# Patient Record
Sex: FEMALE | Race: WHITE | NOT HISPANIC OR LATINO | Employment: FULL TIME | URBAN - METROPOLITAN AREA
[De-identification: names, ages, dates, MRNs, and addresses within clinical notes are randomized per-mention and may not be internally consistent; named-entity substitution may affect disease eponyms.]

---

## 2020-12-08 ENCOUNTER — OFFICE VISIT - HEALTHEAST (OUTPATIENT)
Dept: FAMILY MEDICINE | Facility: CLINIC | Age: 55
End: 2020-12-08

## 2020-12-08 DIAGNOSIS — Z12.31 VISIT FOR SCREENING MAMMOGRAM: ICD-10-CM

## 2020-12-08 DIAGNOSIS — Z12.11 SCREEN FOR COLON CANCER: ICD-10-CM

## 2020-12-08 DIAGNOSIS — H93.13 TINNITUS, BILATERAL: ICD-10-CM

## 2020-12-08 RX ORDER — VALACYCLOVIR HYDROCHLORIDE 1 G/1
TABLET, FILM COATED ORAL
Status: SHIPPED | COMMUNITY
Start: 2020-10-29 | End: 2023-11-09

## 2020-12-15 ENCOUNTER — OFFICE VISIT - HEALTHEAST (OUTPATIENT)
Dept: OTOLARYNGOLOGY | Facility: CLINIC | Age: 55
End: 2020-12-15

## 2020-12-15 ENCOUNTER — OFFICE VISIT - HEALTHEAST (OUTPATIENT)
Dept: AUDIOLOGY | Facility: CLINIC | Age: 55
End: 2020-12-15

## 2020-12-15 DIAGNOSIS — H93.8X3 EAR FULLNESS, BILATERAL: ICD-10-CM

## 2020-12-15 DIAGNOSIS — H90.42 SENSORINEURAL HEARING LOSS (SNHL) OF LEFT EAR WITH UNRESTRICTED HEARING OF RIGHT EAR: ICD-10-CM

## 2020-12-15 DIAGNOSIS — H93.12 LEFT-SIDED TINNITUS: ICD-10-CM

## 2020-12-15 DIAGNOSIS — M26.623 BILATERAL TEMPOROMANDIBULAR JOINT PAIN: ICD-10-CM

## 2020-12-15 DIAGNOSIS — H93.12 TINNITUS, LEFT: ICD-10-CM

## 2021-01-12 ENCOUNTER — COMMUNICATION - HEALTHEAST (OUTPATIENT)
Dept: TELEHEALTH | Facility: CLINIC | Age: 56
End: 2021-01-12

## 2021-01-12 ENCOUNTER — OFFICE VISIT - HEALTHEAST (OUTPATIENT)
Dept: AUDIOLOGY | Facility: CLINIC | Age: 56
End: 2021-01-12

## 2021-01-12 DIAGNOSIS — H93.12 LEFT-SIDED TINNITUS: ICD-10-CM

## 2021-03-09 ENCOUNTER — COMMUNICATION - HEALTHEAST (OUTPATIENT)
Dept: FAMILY MEDICINE | Facility: CLINIC | Age: 56
End: 2021-03-09

## 2021-03-09 ENCOUNTER — HOSPITAL ENCOUNTER (OUTPATIENT)
Dept: MAMMOGRAPHY | Facility: CLINIC | Age: 56
Discharge: HOME | End: 2021-03-09
Attending: FAMILY MEDICINE

## 2021-03-09 DIAGNOSIS — Z12.31 VISIT FOR SCREENING MAMMOGRAM: ICD-10-CM

## 2021-03-29 ENCOUNTER — AMBULATORY - HEALTHEAST (OUTPATIENT)
Dept: NURSING | Facility: CLINIC | Age: 56
End: 2021-03-29

## 2021-04-19 ENCOUNTER — AMBULATORY - HEALTHEAST (OUTPATIENT)
Dept: NURSING | Facility: CLINIC | Age: 56
End: 2021-04-19

## 2021-05-02 ENCOUNTER — HEALTH MAINTENANCE LETTER (OUTPATIENT)
Age: 56
End: 2021-05-02

## 2021-06-05 VITALS
HEART RATE: 84 BPM | RESPIRATION RATE: 16 BRPM | DIASTOLIC BLOOD PRESSURE: 86 MMHG | WEIGHT: 273.5 LBS | SYSTOLIC BLOOD PRESSURE: 141 MMHG

## 2021-06-13 NOTE — PROGRESS NOTES
Assessment/plan   1. Tinnitus, bilateral  Bilateral tinnitus in absence of vertiginous or red flag symptoms.  No focal neurologic findings to warrant MRI.  Otologic exam otherwise normal.  We will proceed with audiometry.  - Ambulatory referral to Audiology    2. Visit for screening mammogram  - Mammo Screening Bilateral; Future    3. Screen for colon cancer  - Vishal Pena DO    Options for treatment and follow-up care were reviewed with the patient. Alena Beatty engaged in the decision making process and verbalized understanding of the options discussed and agreed with the final plan.    This note has been dictated using voice recognition software. Any grammatical or context distortions are unintentional and inherent to the software.    Subjective:      HPI: Alena Beatty is a 55 y.o. female who is here for:    Chief Complaint   Patient presents with     Tinnitus     ringing in ears     2-week history of high-pitched ringing in ears with occasional feelings of muffled hearing.  It is present all the time, but does not necessarily bother her until she pays attention to it.  She had some bilateral ear pain yesterday but that is now resolved.  She has tried numerous things including Benadryl, Liz pot, Sudafed, peroxide, Flonase, Afrin, Mucinex, and humidifiers.  She had a hearing test many years ago that was reportedly normal.  She denies congestion, cough, sinus pain, headache, vision changes, dizziness, lightheadedness.    10 point review systems otherwise negative.    Medical History:  Patient Active Problem List   Diagnosis     ADHD (attention deficit hyperactivity disorder), inattentive type     Intestinal bacterial overgrowth     Screening for malignant neoplasm of cervix     Shellfish allergy     Past Medical History:   Diagnosis Date     ADHD      HSV-2 infection        Medications:  Current Outpatient Medications   Medication Sig Dispense Refill     valACYclovir (VALTREX) 1000 MG  tablet        No current facility-administered medications for this visit.        Imaging & Labs reviewed this visit: none    Objective:    /86 (Patient Site: Left Arm, Patient Position: Sitting, Cuff Size: Adult Large)   Pulse 84   Resp 16   Wt (!) 273 lb 8 oz (124.1 kg)   LMP 12/08/2016     Physical Exam:   General appearance: Alert, cooperative, no distress, appears stated age  Head: Normocephalic, atraumatic, without obvious abnormality  Ears: TM's gray dull with structures seen bilaterally  Eyes: Pupils equal round, reactive.  Conjunctiva clear.  Nose: Nares normal, no drainage.  Throat: Lips, mucosa, tongue normal mucosa pink and moist  Neck: Supple, symmetric, trachea midline, no adenopathy.  No thyroid enlargement, tenderness or nodules.    Lungs: Clear to auscultation bilaterally, no wheezing or crackles present.  Respirations unlabored  Heart: Regular rate and rhythm, normal S1 and S2, no murmur, rub or gallop.  Neuro:  Awake, alert, responsive to voice, follows commands. Clear coherent speech. No facial droop.. No dysarthria or word finding difficulties. CNII-XII intact. No gait ataxia.     No results found for this or any previous visit (from the past 24 hour(s)).

## 2021-06-13 NOTE — PROGRESS NOTES
CHIEF COMPLAINT:   Chief Complaint   Patient presents with     Tinnitus     C/o ear rinning for last three weeks, mostly on left side.          HISTORY OF PRESENT ILLNESS    Alena was seen at the behest of Jaclyn Pena for left sided tinnitus.  This has been present for 3 weeks.  She describes it as a high pitched buzz.  She was walking her dog three weeks ago and both ear felt plugged but the sound of traffic nearby was exremely loud.  She gets sharp pain in both ears intermittently that lasts seconds.  No obvious trigger.  No dizziness or vertigo.  No history of noise exposure.  Tinnitus is rated as 2/10.   History of seasonal allergies (spring/fall).   Some dry cough and runny nose during these seasons.  She was placed on wellbutrin for concentration issues (ADD) and recently stopped 2 weeks ago due to clenching.  No other ear nose or throat related complaints today.    Chief Complaint   Patient presents with     Tinnitus     C/o ear rinning for last three weeks, mostly on left side.             REVIEW OF SYSTEMS    Review of Systems: a 10-system review is reviewed at this encounter.  See scanned document.     Shellfish containing products and Amoxicillin       There were no vitals filed for this visit.      PHYSICAL EXAM:        HEAD: Normal appearance and symmetry:  No cutaneous lesions.      EARS:    Normal TM's bilaterally. Normal auditory canals and external ears. Non-tender.         NOSE:    Dorsum:   straight  Septum: normal  Mucosa:  moist  Inferior turbinates:  normal       ORAL CAVITY/OROPHARYNX:    Lips:  Normal.  Tongue: normal, midline  Mucosa:   no lesions  Tonsils:  1+      NECK:  Trachea:  midline.   Thyroid:  normal   Adenopathy:  none       NEURO:   Alert and Oriented    GAIT AND STATION:  normal     RESPIRATORY:   Symmetry and Respiratory effort    PSYCH:   normal mood and affect    SKIN:  warm and dry     Audiogram was reviewed today which shows some mild high-frequency hearing loss in the  left ear.  Word recognition is 100% bilaterally tympanograms are normal.    IMPRESSION:    Encounter Diagnoses   Name Primary?     Tinnitus, left Yes     Ear fullness, bilateral      Sensorineural hearing loss (SNHL) of left ear with unrestricted hearing of right ear      Bilateral temporomandibular joint pain           RECOMMENDATIONS:    Patient has new onset left-sided tinnitus with some asymmetric hearing loss on the left ear.  Recommend screening ABR.  Discussed the possibility of a CP angle lesion although it is unlikely.  Patient is agreeable to this plan of care and will comply.  Recommend repeat hearing test in 6 months.  If hearing is stable to recommend annual hearing test.  All questions were answered she is agreeable this plan of care      ABR referral placed  Return visit for repeat hearing in 6 months  Return immediately for any sudden change in hearing

## 2021-06-14 NOTE — PROGRESS NOTES
Patient notified of normal ABR results per Dr. Chacon. She will be following up in 6 months with a hearing test.    Katie Jensen RN  [facility]  [phone]

## 2021-06-14 NOTE — PROGRESS NOTES
"Audiology Report:    Referring Provider: Nick Chacon MD     SUBJECTIVE: Alena Ferro, 55 y.o. female, was seen on 1/12/2021 for a neurodiagnostic Auditory Brainstem Response (ABR) evaluation. [place] hearing was last assessed on 12/15/2020 and results revealed normal hearing bilaterally with 5-10 dBHL asymmetry from 1-8kHz, left ear worse. She continues to report concerns of left-sided constant high-pitched tinnitus, decreased hearing in the left ear, a sensation in the left ear that she likens to, \"a pin scratching the surface of your skin; not painful, and not a plugged sensation.\" She also reports recent dizziness lasting for a few seconds when she rolls over in bed. She recalls experiencing vertigo for 3 weeks during December 2019 in which she became quite nauseous. She tried doing the epley maneuver during the 3 weeks of nausea, but that made the nausea worse.    OBJECTIVE: Otoscopy revealed clear canals bilaterally. Tympanograms showed normal middle ear function bilaterally.     Neurodiagnostic protocol completed on the [employer] Eclipse EP-25 system. Absolute wave, interwave, and interaural latency differences assessed. Interaural interpeak (I-III or I-V) latency differences exceeding 0.40 ms or absent wave III and/or wave V, should be interpreted as evidence of possible retrocochlear pathology. Click stimuli at 90 dBnHL completed at various rates (21.1, 11.1, and 71.1/sec).    Interaural latencies (I-III and I-V) do not differ by more than 0.40 ms; therefore, are within normal limits.     ASSESSMENT: Interaural latencies do not differ by more than 0.40 ms; therefore, are within normal limits. Results do not suggest the presence of possible retrocochlear pathology.     PLAN: Normal findings were discussed with the patient today. She will receive a call from Dr. Chacon to further discuss today's results and the next steps of care. Alena Ferro will follow up with audiology in 6 months for " a repeat hearing test, or sooner if changes in hearing are noticed.     Irina Koch, CCC-A  Clinical Audiologist   [place] #[id number]     CC: Nick Chacon MD

## 2021-06-15 NOTE — TELEPHONE ENCOUNTER
Reason contacted:  Results  Information relayed: Informed patient of message below. Patient states understanding.  Additional questions:  No  Further follow-up needed:  No  Okay to leave a detailed message:  No

## 2021-06-15 NOTE — TELEPHONE ENCOUNTER
Left message to call back for: Results  Information to relay to patient:  LMTCB, Please relay message below from provider.

## 2021-06-15 NOTE — TELEPHONE ENCOUNTER
----- Message from Jaclyn Pena DO sent at 3/9/2021 12:04 PM CST -----  Team - please call patient with results.      Normal mammogram, plan to repeat in 1 year.    Jaclyn Pena DO

## 2021-06-18 NOTE — PATIENT INSTRUCTIONS - HE
Patient Instructions by Nick Chacon MD at 12/15/2020  3:20 PM     Author: Nick Chacon MD Service: -- Author Type: Physician    Filed: 12/15/2020  3:44 PM Encounter Date: 12/15/2020 Status: Addendum    : Nick Chacon MD (Physician)    Related Notes: Original Note by Nick Chacon MD (Physician) filed at 12/15/2020  3:31 PM       Patient Education     Tinnitus (Ringing in the Ears)     Treatment may include maskers and hearing aids.     Tinnitus is the term for a noise in your ear not caused by an outside sound. The noise might be a ringing, clicking, hiss, or roar. It can vary in pitch and may be soft or quite loud. For some people, tinnitus is a minor nuisance. But for others, the noise can make it hard to hear, work, and even sleep. When tinnitus can't be cured, a number of treatments may offer relief.  What causes tinnitus?  Loud noises, hearing loss, and ear wax can cause tinnitus. So can certain medicines. Large amounts of aspirin or caffeine are sometimes to blame. In many cases, the exact cause of tinnitus is unknown.  How is tinnitus treated?  Identifying and removing the cause is the best way to treat tinnitus. For that reason, your healthcare provider may refer you to an otolaryngologist (ear, nose, and throat doctor). Your hearing may also be checked by an audiologist (hearing specialist). If you have hearing loss, wearing a hearing aid may help your tinnitus. When the cause can't be found, the tinnitus itself may be treated. Some of the treatments are listed below, and your healthcare provider can tell you more about them:    Maskers are small devices that look like hearing aids. They emit a pleasant sound that helps cover up the ringing in your ears. Hearing aids and maskers are sometimes used together.    Medicines that treat anxiety and depression may ease tinnitus in some people.    Hypnosis or relaxation therapy may help head noise seem less severe.    Tinnitus retraining therapy  combines counseling and maskers. Both can help take your mind off the tinnitus.  For more information    American Speech-Hearing-Language Association [phone] www.akiko.org    American Tinnitus Association [phone] www.alexandria.org    National [place] on Deafness and other Communication Disorders 162-969-2336 www.nidcd.nih.gov   Date Last Reviewed: 7/1/2016 2000-2019 [employer]. [address] [place] [address], [place] [address]. All rights reserved. This information is not intended as a substitute for professional medical care. Always follow your healthcare professional's instructions.         ABR referral placed  Return visit for repeat hearing in 6 months  Return immediately for any sudden change in hearing

## 2021-06-18 NOTE — PATIENT INSTRUCTIONS - HE
Patient Instructions by Jaclyn Pena DO at 12/8/2020  1:40 PM     Author: Jaclyn Pena DO Service: -- Author Type: Physician    Filed: 12/8/2020  2:09 PM Encounter Date: 12/8/2020 Status: Signed    : Jaclyn Pena DO (Physician)       Patient Education     Tinnitus (Ringing in the Ears)     Treatment may include maskers and hearing aids.     Tinnitus is the term for a noise in your ear not caused by an outside sound. The noise might be a ringing, clicking, hiss, or roar. It can vary in pitch and may be soft or quite loud. For some people, tinnitus is a minor nuisance. But for others, the noise can make it hard to hear, work, and even sleep. When tinnitus can't be cured, a number of treatments may offer relief.  What causes tinnitus?  Loud noises, hearing loss, and ear wax can cause tinnitus. So can certain medicines. Large amounts of aspirin or caffeine are sometimes to blame. In many cases, the exact cause of tinnitus is unknown.  How is tinnitus treated?  Identifying and removing the cause is the best way to treat tinnitus. For that reason, your healthcare provider may refer you to an otolaryngologist (ear, nose, and throat doctor). Your hearing may also be checked by an audiologist (hearing specialist). If you have hearing loss, wearing a hearing aid may help your tinnitus. When the cause can't be found, the tinnitus itself may be treated. Some of the treatments are listed below, and your healthcare provider can tell you more about them:    Maskers are small devices that look like hearing aids. They emit a pleasant sound that helps cover up the ringing in your ears. Hearing aids and maskers are sometimes used together.    Medicines that treat anxiety and depression may ease tinnitus in some people.    Hypnosis or relaxation therapy may help head noise seem less severe.    Tinnitus retraining therapy combines counseling and maskers. Both can help take your mind off the tinnitus.  For more  information    American Speech-Hearing-Language Association [phone] www.akiko.org    American Tinnitus Association [phone] www.alexandria.org    National [place] on Deafness and other Communication Disorders 660-681-7402 www.nidcd.nih.gov   Date Last Reviewed: 7/1/2016 2000-2019 [employer]. [address] [place] [address]. All rights reserved. This information is not intended as a substitute for professional medical care. Always follow your healthcare professional's instructions.

## 2021-06-23 ENCOUNTER — OFFICE VISIT - HEALTHEAST (OUTPATIENT)
Dept: AUDIOLOGY | Facility: CLINIC | Age: 56
End: 2021-06-23

## 2021-06-23 ENCOUNTER — RECORDS - HEALTHEAST (OUTPATIENT)
Dept: ADMINISTRATIVE | Facility: OTHER | Age: 56
End: 2021-06-23

## 2021-06-23 DIAGNOSIS — H93.13 TINNITUS OF BOTH EARS: ICD-10-CM

## 2021-06-30 NOTE — PROGRESS NOTES
"Progress Notes by Chhaya Mcelroy AuD at 12/15/2020  2:40 PM     Author: Chhaya Mcelroy AuD Service: -- Author Type: Audiologist    Filed: 12/15/2020  3:11 PM Encounter Date: 12/15/2020 Status: Signed    : Chhaya Mcelroy AuD (Audiologist)       Audiology Report    Summary: Audiology visit completed. Please see audiogram below or in \"media\" tab for case history and results.     Plan:  The patient is returned to ENT for follow up. Return for retesting with ENT recommendation.     Irina Tierney, [facility]  Clinical Audiologist  [place] #[id number]           "

## 2021-07-04 NOTE — PROGRESS NOTES
"Progress Notes by Yolanda Benavidez AuD at 6/23/2021  8:00 AM     Author: Yolanda Benavidez AuD Service: -- Author Type: Audiologist    Filed: 6/23/2021  8:34 AM Encounter Date: 6/23/2021 Status: Signed    : Yolanda Benavidez AuD (Audiologist)       Audiology Report:    Referring Provider: Dr. Pereira    Summary: Audiology visit completed. Please see audiogram below or in \"media\" tab for case history and results.      Transducer: Insert earphones and Circumaural headphones    Reliability was good  and there was good  SRT to PTA agreement. These results show a slight improvement in hearing in the left ear which reduces the asymmetry found on 12/15/20.      PLAN: Alena is returned to ENT for follow up. She should return as required for medical management.     Irina Stiles, [facility]  [place] Licensed Audiologist #[id number]                "

## 2021-10-17 ENCOUNTER — HEALTH MAINTENANCE LETTER (OUTPATIENT)
Age: 56
End: 2021-10-17

## 2022-04-14 ENCOUNTER — ANCILLARY PROCEDURE (OUTPATIENT)
Dept: MAMMOGRAPHY | Facility: CLINIC | Age: 57
End: 2022-04-14
Attending: FAMILY MEDICINE
Payer: COMMERCIAL

## 2022-04-14 DIAGNOSIS — Z12.31 VISIT FOR SCREENING MAMMOGRAM: ICD-10-CM

## 2022-04-14 PROCEDURE — 77067 SCR MAMMO BI INCL CAD: CPT

## 2022-05-29 ENCOUNTER — HEALTH MAINTENANCE LETTER (OUTPATIENT)
Age: 57
End: 2022-05-29

## 2022-10-02 ENCOUNTER — HEALTH MAINTENANCE LETTER (OUTPATIENT)
Age: 57
End: 2022-10-02

## 2023-03-10 ENCOUNTER — OFFICE VISIT (OUTPATIENT)
Dept: FAMILY MEDICINE | Facility: CLINIC | Age: 58
End: 2023-03-10
Payer: COMMERCIAL

## 2023-03-10 VITALS
OXYGEN SATURATION: 97 % | RESPIRATION RATE: 16 BRPM | HEART RATE: 88 BPM | HEIGHT: 69 IN | BODY MASS INDEX: 43.4 KG/M2 | TEMPERATURE: 98.9 F | WEIGHT: 293 LBS | DIASTOLIC BLOOD PRESSURE: 84 MMHG | SYSTOLIC BLOOD PRESSURE: 126 MMHG

## 2023-03-10 DIAGNOSIS — Z12.11 SCREEN FOR COLON CANCER: ICD-10-CM

## 2023-03-10 DIAGNOSIS — Z12.4 CERVICAL CANCER SCREENING: ICD-10-CM

## 2023-03-10 DIAGNOSIS — R10.2 PELVIC PAIN IN FEMALE: ICD-10-CM

## 2023-03-10 DIAGNOSIS — B37.31 VULVOVAGINAL CANDIDIASIS: ICD-10-CM

## 2023-03-10 DIAGNOSIS — E66.813 CLASS 3 SEVERE OBESITY DUE TO EXCESS CALORIES WITH SERIOUS COMORBIDITY AND BODY MASS INDEX (BMI) OF 40.0 TO 44.9 IN ADULT (H): ICD-10-CM

## 2023-03-10 DIAGNOSIS — R31.9 HEMATURIA, UNSPECIFIED TYPE: ICD-10-CM

## 2023-03-10 DIAGNOSIS — E66.01 CLASS 3 SEVERE OBESITY DUE TO EXCESS CALORIES WITH SERIOUS COMORBIDITY AND BODY MASS INDEX (BMI) OF 40.0 TO 44.9 IN ADULT (H): ICD-10-CM

## 2023-03-10 DIAGNOSIS — Z12.4 SCREENING FOR MALIGNANT NEOPLASM OF CERVIX: ICD-10-CM

## 2023-03-10 DIAGNOSIS — R35.0 URINARY FREQUENCY: ICD-10-CM

## 2023-03-10 DIAGNOSIS — Z23 NEED FOR VACCINATION: Primary | ICD-10-CM

## 2023-03-10 LAB
ALBUMIN UR-MCNC: NEGATIVE MG/DL
ANION GAP SERPL CALCULATED.3IONS-SCNC: 11 MMOL/L (ref 7–15)
APPEARANCE UR: CLEAR
BILIRUB UR QL STRIP: NEGATIVE
BUN SERPL-MCNC: 12.7 MG/DL (ref 6–20)
CALCIUM SERPL-MCNC: 9.4 MG/DL (ref 8.6–10)
CHLORIDE SERPL-SCNC: 107 MMOL/L (ref 98–107)
COLOR UR AUTO: YELLOW
CREAT SERPL-MCNC: 0.96 MG/DL (ref 0.51–0.95)
CREAT UR-MCNC: 162.7 MG/DL
DEPRECATED HCO3 PLAS-SCNC: 24 MMOL/L (ref 22–29)
ERYTHROCYTE [DISTWIDTH] IN BLOOD BY AUTOMATED COUNT: 12.6 % (ref 10–15)
GFR SERPL CREATININE-BSD FRML MDRD: 68 ML/MIN/1.73M2
GLUCOSE SERPL-MCNC: 104 MG/DL (ref 70–99)
GLUCOSE UR STRIP-MCNC: NEGATIVE MG/DL
HCT VFR BLD AUTO: 43.5 % (ref 35–47)
HGB BLD-MCNC: 14.5 G/DL (ref 11.7–15.7)
HGB UR QL STRIP: ABNORMAL
KETONES UR STRIP-MCNC: NEGATIVE MG/DL
LEUKOCYTE ESTERASE UR QL STRIP: NEGATIVE
MCH RBC QN AUTO: 30.3 PG (ref 26.5–33)
MCHC RBC AUTO-ENTMCNC: 33.3 G/DL (ref 31.5–36.5)
MCV RBC AUTO: 91 FL (ref 78–100)
MICROALBUMIN UR-MCNC: <12 MG/L
MICROALBUMIN/CREAT UR: NORMAL MG/G{CREAT}
NITRATE UR QL: NEGATIVE
PH UR STRIP: 5.5 [PH] (ref 5–7)
PLATELET # BLD AUTO: 218 10E3/UL (ref 150–450)
POTASSIUM SERPL-SCNC: 3.7 MMOL/L (ref 3.4–5.3)
RBC # BLD AUTO: 4.79 10E6/UL (ref 3.8–5.2)
RBC #/AREA URNS AUTO: ABNORMAL /HPF
SODIUM SERPL-SCNC: 142 MMOL/L (ref 136–145)
SP GR UR STRIP: 1.02 (ref 1–1.03)
SQUAMOUS #/AREA URNS AUTO: ABNORMAL /LPF
UROBILINOGEN UR STRIP-ACNC: 0.2 E.U./DL
WBC # BLD AUTO: 6.1 10E3/UL (ref 4–11)
WBC #/AREA URNS AUTO: ABNORMAL /HPF

## 2023-03-10 PROCEDURE — 36415 COLL VENOUS BLD VENIPUNCTURE: CPT | Performed by: STUDENT IN AN ORGANIZED HEALTH CARE EDUCATION/TRAINING PROGRAM

## 2023-03-10 PROCEDURE — 85027 COMPLETE CBC AUTOMATED: CPT | Performed by: STUDENT IN AN ORGANIZED HEALTH CARE EDUCATION/TRAINING PROGRAM

## 2023-03-10 PROCEDURE — 90715 TDAP VACCINE 7 YRS/> IM: CPT | Performed by: STUDENT IN AN ORGANIZED HEALTH CARE EDUCATION/TRAINING PROGRAM

## 2023-03-10 PROCEDURE — 87086 URINE CULTURE/COLONY COUNT: CPT | Performed by: STUDENT IN AN ORGANIZED HEALTH CARE EDUCATION/TRAINING PROGRAM

## 2023-03-10 PROCEDURE — 90471 IMMUNIZATION ADMIN: CPT | Performed by: STUDENT IN AN ORGANIZED HEALTH CARE EDUCATION/TRAINING PROGRAM

## 2023-03-10 PROCEDURE — 99214 OFFICE O/P EST MOD 30 MIN: CPT | Mod: 25 | Performed by: STUDENT IN AN ORGANIZED HEALTH CARE EDUCATION/TRAINING PROGRAM

## 2023-03-10 PROCEDURE — 82570 ASSAY OF URINE CREATININE: CPT | Performed by: STUDENT IN AN ORGANIZED HEALTH CARE EDUCATION/TRAINING PROGRAM

## 2023-03-10 PROCEDURE — 81001 URINALYSIS AUTO W/SCOPE: CPT | Performed by: STUDENT IN AN ORGANIZED HEALTH CARE EDUCATION/TRAINING PROGRAM

## 2023-03-10 PROCEDURE — 87624 HPV HI-RISK TYP POOLED RSLT: CPT | Performed by: STUDENT IN AN ORGANIZED HEALTH CARE EDUCATION/TRAINING PROGRAM

## 2023-03-10 PROCEDURE — G0145 SCR C/V CYTO,THINLAYER,RESCR: HCPCS | Performed by: STUDENT IN AN ORGANIZED HEALTH CARE EDUCATION/TRAINING PROGRAM

## 2023-03-10 PROCEDURE — 80048 BASIC METABOLIC PNL TOTAL CA: CPT | Performed by: STUDENT IN AN ORGANIZED HEALTH CARE EDUCATION/TRAINING PROGRAM

## 2023-03-10 PROCEDURE — 82043 UR ALBUMIN QUANTITATIVE: CPT | Performed by: STUDENT IN AN ORGANIZED HEALTH CARE EDUCATION/TRAINING PROGRAM

## 2023-03-10 RX ORDER — FLUCONAZOLE 150 MG/1
150 TABLET ORAL ONCE
Qty: 1 TABLET | Refills: 0 | Status: SHIPPED | OUTPATIENT
Start: 2023-03-10 | End: 2023-03-10

## 2023-03-10 ASSESSMENT — PAIN SCALES - GENERAL: PAINLEVEL: MILD PAIN (2)

## 2023-03-10 NOTE — PROGRESS NOTES
Assessment & Plan     Pelvic pain in female  Urinary frequency  Patient is a pleasant 58-year-old female who presents today for suprapubic abdominal discomfort.  Patient started having abdominal discomfort about 2 weeks ago.  Did do a virtual visit, was prescribed Macrobid.  She did not find that this was helpful.  She finished this a few days ago.  She continues to have some suprapubic abdominal discomfort associated with nausea, no vomiting. She has not had any hematuria, vaginal bleeding, or stool changes. Denies any flank pain or back pain. Beyond this, for the past few years, patient has been having significant urinary frequency with minimal urinary output. Did have urgency, but this has since resolved. She has not been sexually active and is not concerned regarding STIs.     Etiology of pain is unclear at this time, though acute UTI not covered by Macrobid is most likely current diagnosis. We did obtain a CBC to evaluate WBCs, as well as a BMP to check renal function.     If no obvious cause of symptoms is found on below workup, plan for pelvic ultrasound. Did have some external irritation of the labia, and noted that she could try OTC Monistat externally for now, and may try internal Monistat a well.     Regarding ongoing chronic urinary symptoms, considered diagnoses include interstitial cystitis and overactive bladder in particular. Consider referral to urology soon.   - Basic metabolic panel  - CBC with platelets  - UA Macro with Reflex to Micro and Culture - lab collect    Screen for colon cancer  Requests fit test for colon cancer screening. Asymptomatic.   - Fecal colorectal cancer screen (FIT)    Cervical cancer screening  Screening for malignant neoplasm of cervix  Pap collected today  - Pap Screen with HPV - recommended age 30 - 65 years    Class 3 severe obesity due to excess calories with serious comorbidity and body mass index (BMI) of 40.0 to 44.9 in adult (H)  Patient with obesity. She does  "have prediabetes as well. Plans to follow up soon to discuss weight. Will plan to follow up after acute concerns have been addressed to discuss weight management.     Need for vaccination  - TDAP VACCINE (Adacel, Boostrix)      I spent a total of 35 minutes on the day of the visit.   Time spent doing chart review, history and exam, documentation and further activities per the note     BMI:   Estimated body mass index is 44.89 kg/m  as calculated from the following:    Height as of this encounter: 1.753 m (5' 9\").    Weight as of this encounter: 137.9 kg (304 lb).   Weight management plan: Will return soon for discussion of weight management    No follow-ups on file.    Lynn Eid MD  [facility]    Nilton Carvajal is a 58 year old, presenting for the following health issues:  Abdominal Pain      History of Present Illness       Reason for visit:  Abdominal pain  Symptom onset:  1-2 weeks ago    She eats 2-3 servings of fruits and vegetables daily.She consumes 0 sweetened beverage(s) daily.She exercises with enough effort to increase her heart rate 9 or less minutes per day.  She exercises with enough effort to increase her heart rate 3 or less days per week.   She is taking medications regularly.       Abdominal Pain      Duration: 2 weeks     Description (location/character/radiation): pelvic pain        Associated flank pain: None    Intensity:  moderate    Accompanying signs and symptoms:        Fever/Chills: YES- has felt feverish        Gas/Bloating: no        Nausea/vomitting: YES- nausea        Diarrhea: no        Dysuria or Hematuria: YES- frequency and little amounts        Denies any abnormal discharge     History (previous similar pain/trauma/previous testing): no     Precipitating or alleviating factors:       Pain worse with eating/BM/urination: no        Pain relieved by BM: no     Therapies tried and outcome: Macrobid ([facility]) did not help - completed " "yesterday     LMP:  Menopause    not sexually active     suyper tired, low grade fever sendy  Suprapubic pain    Doc on demand got antibiotic.   No othe joe symtoms though. Didn't work   day 4 of 5 stopped, didn't help at all.   Sitting here is fine  Standing and walking feels the pain    Not cramping.     Having bowel movements  Urinating frequently but a small amount each time. - this is relatively new - couple years new.   Gets up at night, too     Used to have urgency.     Some nausea. No vomiting. eps when wake up in morning.   More upset stomach. Feels better with eating.   Abdominal surgeries before - none.     2016 went in tired one time. Pulse was 150.     No blood in urine or vaginal bleeding.     Review of Systems   Constitutional, HEENT, cardiovascular, pulmonary, GI, , musculoskeletal, neuro, skin, endocrine and psych systems are negative, except as otherwise noted.      Objective    /84 (Cuff Size: Adult Large)   Pulse 88   Temp 98.9  F (37.2  C) (Tympanic)   Resp 16   Ht 1.753 m (5' 9\")   Wt 137.9 kg (304 lb)   SpO2 97%   BMI 44.89 kg/m    Body mass index is 44.89 kg/m .  Physical Exam  Exam conducted with a chaperone present.   Constitutional:       Appearance: Normal appearance.   HENT:      Head: Normocephalic.   Eyes:      General: No scleral icterus.     Extraocular Movements: Extraocular movements intact.      Conjunctiva/sclera: Conjunctivae normal.   Cardiovascular:      Rate and Rhythm: Normal rate and regular rhythm.      Heart sounds: Normal heart sounds.   Pulmonary:      Effort: Pulmonary effort is normal.      Breath sounds: Normal breath sounds.   Abdominal:      General: Bowel sounds are normal.      Palpations: Abdomen is soft.      Tenderness: There is abdominal tenderness (suprapubic). There is no right CVA tenderness or left CVA tenderness.   Genitourinary:     Exam position: Lithotomy position.      Urethra: No prolapse.      Vagina: Vaginal discharge present.      " Cervix: Normal.      Comments: Erythema at labial folds. Some loss of structure of labia. No visualized rectocele or cystocele.   Musculoskeletal:         General: Normal range of motion.      Cervical back: Normal range of motion.   Neurological:      General: No focal deficit present.      Mental Status: She is alert and oriented to person, place, and time.         Results from this visitNo results found for any visits on 03/10/23.

## 2023-03-12 LAB — BACTERIA UR CULT: NORMAL

## 2023-03-13 ENCOUNTER — TELEPHONE (OUTPATIENT)
Dept: UROLOGY | Facility: CLINIC | Age: 58
End: 2023-03-13
Payer: COMMERCIAL

## 2023-03-13 DIAGNOSIS — R10.2 SUPRAPUBIC ABDOMINAL PAIN: ICD-10-CM

## 2023-03-13 DIAGNOSIS — R35.0 URINARY FREQUENCY: Primary | ICD-10-CM

## 2023-03-13 NOTE — TELEPHONE ENCOUNTER
[facility] Call Center    Phone Message    May a detailed message be left on voicemail: yes     Reason for Call: Pt referred for LUTS (urinary frequency, new suprapubic discomfort and small hematuria, concern for overactive bladder vs interstitial cystitis vs other etology. referral for workup/management) referred by Lynn Eid.    Referral is not priority, came in as routine. Pt scheduled for first available in [place] with [place]ah on 4/11/21. Pt states she has abdominal pain, nausea, fever x2 weeks. Please review and follow-up with patient to see if pt needs to be seen sooner as she is worried about her symptoms and states it's affecting her daily life.    Action Taken: Message routed to:  Other: [place] [facility]    Travel Screening: Not Applicable

## 2023-03-13 NOTE — TELEPHONE ENCOUNTER
Please see office visit with PCP and advise if pt can be seen sooner.   Pooja FU RN BSN PHN  [facility]

## 2023-03-14 NOTE — TELEPHONE ENCOUNTER
"Per Therese RAMIREZ:    \"I really have no earlier availability. Abdominal pain, nausea, and fever in the absence of a UTI or kidney stone would unlikely be urologic in nature.   Has she had a CT scan? \"     Forwarding info to refer Provider because imaging consideration was mentioned in Office Note.    Diane TREVINO   [facility] RN  "

## 2023-03-15 LAB
BKR LAB AP GYN ADEQUACY: NORMAL
BKR LAB AP GYN INTERPRETATION: NORMAL
BKR LAB AP HPV REFLEX: NORMAL
BKR LAB AP PREVIOUS ABNORMAL: NORMAL
PATH REPORT.COMMENTS IMP SPEC: NORMAL
PATH REPORT.COMMENTS IMP SPEC: NORMAL
PATH REPORT.RELEVANT HX SPEC: NORMAL

## 2023-03-16 LAB
HUMAN PAPILLOMA VIRUS 16 DNA: NEGATIVE
HUMAN PAPILLOMA VIRUS 18 DNA: NEGATIVE
HUMAN PAPILLOMA VIRUS FINAL DIAGNOSIS: NORMAL
HUMAN PAPILLOMA VIRUS OTHER HR: NEGATIVE

## 2023-03-22 ENCOUNTER — HOSPITAL ENCOUNTER (OUTPATIENT)
Dept: CT IMAGING | Facility: CLINIC | Age: 58
Discharge: HOME | End: 2023-03-22
Attending: STUDENT IN AN ORGANIZED HEALTH CARE EDUCATION/TRAINING PROGRAM | Admitting: STUDENT IN AN ORGANIZED HEALTH CARE EDUCATION/TRAINING PROGRAM
Payer: COMMERCIAL

## 2023-03-22 DIAGNOSIS — R10.2 SUPRAPUBIC ABDOMINAL PAIN: ICD-10-CM

## 2023-03-22 DIAGNOSIS — R35.0 URINARY FREQUENCY: ICD-10-CM

## 2023-03-22 PROCEDURE — 74176 CT ABD & PELVIS W/O CONTRAST: CPT

## 2023-04-11 ENCOUNTER — OFFICE VISIT (OUTPATIENT)
Dept: UROLOGY | Facility: CLINIC | Age: 58
End: 2023-04-11
Payer: COMMERCIAL

## 2023-04-11 VITALS
HEART RATE: 92 BPM | SYSTOLIC BLOOD PRESSURE: 157 MMHG | DIASTOLIC BLOOD PRESSURE: 91 MMHG | OXYGEN SATURATION: 96 % | TEMPERATURE: 99.4 F

## 2023-04-11 DIAGNOSIS — R35.0 URINARY FREQUENCY: ICD-10-CM

## 2023-04-11 DIAGNOSIS — R31.29 MICROSCOPIC HEMATURIA: Primary | ICD-10-CM

## 2023-04-11 DIAGNOSIS — R10.2 PELVIC PAIN IN FEMALE: ICD-10-CM

## 2023-04-11 LAB
ALBUMIN UR-MCNC: NEGATIVE MG/DL
APPEARANCE UR: ABNORMAL
BILIRUB UR QL STRIP: NEGATIVE
COLOR UR AUTO: YELLOW
GLUCOSE UR STRIP-MCNC: NEGATIVE MG/DL
HGB UR QL STRIP: NEGATIVE
KETONES UR STRIP-MCNC: NEGATIVE MG/DL
LEUKOCYTE ESTERASE UR QL STRIP: NEGATIVE
MUCOUS THREADS #/AREA URNS LPF: PRESENT /LPF
NITRATE UR QL: NEGATIVE
PH UR STRIP: 6 [PH] (ref 5–7)
RBC #/AREA URNS AUTO: ABNORMAL /HPF
SP GR UR STRIP: 1.02 (ref 1–1.03)
SQUAMOUS #/AREA URNS AUTO: ABNORMAL /LPF
UROBILINOGEN UR STRIP-ACNC: 0.2 E.U./DL
WBC #/AREA URNS AUTO: ABNORMAL /HPF

## 2023-04-11 PROCEDURE — 51798 US URINE CAPACITY MEASURE: CPT | Performed by: STUDENT IN AN ORGANIZED HEALTH CARE EDUCATION/TRAINING PROGRAM

## 2023-04-11 PROCEDURE — 81001 URINALYSIS AUTO W/SCOPE: CPT | Performed by: STUDENT IN AN ORGANIZED HEALTH CARE EDUCATION/TRAINING PROGRAM

## 2023-04-11 PROCEDURE — 87086 URINE CULTURE/COLONY COUNT: CPT | Performed by: STUDENT IN AN ORGANIZED HEALTH CARE EDUCATION/TRAINING PROGRAM

## 2023-04-11 PROCEDURE — 99203 OFFICE O/P NEW LOW 30 MIN: CPT | Mod: 25 | Performed by: STUDENT IN AN ORGANIZED HEALTH CARE EDUCATION/TRAINING PROGRAM

## 2023-04-11 ASSESSMENT — PAIN SCALES - GENERAL: PAINLEVEL: MILD PAIN (2)

## 2023-04-11 NOTE — PROGRESS NOTES
Chief Complaint:   Pelvic pain         History of Present Illness:   Alena Ferro is a 58 year old female with a history of ADHD who presents for evaluation of pelvic pain.     The patient reports a two month history of suprapubic pain, which has been improving. She reports more significant pain with movement, particularly with walking. The pain is associated with nausea. She denies vomiting. She reports urinary frequency with small volume voids. She reports daytime frequency every two hours. She will also wake up every two hours to void. She denies urgency and incontinence. She denies dysuria or changes in her pelvic pain with urination.    She had a CT abdomen pelvis without contrast on 3/22/2023 which noted an unremarkable urinary tract. UA from 3/10/2023 noted 2-5 RBCs, urine culture was negative.    She has a cousin who was found to have a urethral diverticulum. She had similar symptoms to the patient prior to diagnosis.          Past Medical History:     Past Medical History:   Diagnosis Date     ADHD      Breast cyst      Ear pain     sharp pains     HSV-2 infection      Tinnitus             Past Surgical History:   No past surgical history on file.         Medications     Current Outpatient Medications   Medication     valACYclovir (VALTREX) 1000 MG tablet     No current facility-administered medications for this visit.            Allergies:   Shellfish containing products [shellfish-derived products] and Amoxicillin         Review of Systems:  From intake questionnaire   Negative 14 system review except as noted on HPI, nurse's note.         Physical Exam:   Patient is a 58 year old  female   Vitals: Blood pressure (!) 157/91, pulse 92, temperature 99.4  F (37.4  C), temperature source Tympanic, SpO2 96 %, not currently breastfeeding.  General Appearance Adult: Alert, no acute distress, oriented.  Lungs: Non-labored breathing.  Heart: No obvious jugular venous distension present.  Neuro:  Alert, oriented, speech and mentation normal    PVR: 100 mL      Labs and Pathology:    I personally reviewed all applicable laboratory data and went over findings with patient  Significant for:    CBC RESULTS:  Recent Labs   Lab Test 03/10/23  1655   WBC 6.1   HGB 14.5           BMP RESULTS:  Recent Labs   Lab Test 03/10/23  1655      POTASSIUM 3.7   CHLORIDE 107   CO2 24   ANIONGAP 11   *   BUN 12.7   CR 0.96*   GFRESTIMATED 68   PLACIDO 9.4       UA RESULTS:   Recent Labs   Lab Test 03/10/23  1655   SG 1.025   URINEPH 5.5   NITRITE Negative   RBCU 2-5*   WBCU 0-5           Imaging:    I personally reviewed all applicable imaging and went over findings with patient.  Significant for:    Results for orders placed or performed during the hospital encounter of 03/22/23   CT Abdomen Pelvis w/o Contrast    Narrative    CT ABDOMEN AND PELVIS WITHOUT CONTRAST 3/22/2023 8:22 AM    CLINICAL HISTORY: Urinary frequency; Suprapubic abdominal pain.  TECHNIQUE: CT scan of the abdomen and pelvis was performed without IV  contrast. Multiplanar reformats were obtained. Dose reduction  techniques were used.  CONTRAST: None.    COMPARISON: None.    FINDINGS:   LOWER CHEST: Normal.    HEPATOBILIARY: Diffuse hepatic steatosis. No significant mass. No bile  duct dilatation. No calcified gallstones.    PANCREAS: Normal.    SPLEEN: Normal.    ADRENAL GLANDS: Normal.    KIDNEYS/BLADDER: No significant mass, stones, or hydronephrosis.    BOWEL: Diverticulosis in the colon. No acute inflammatory change. No  obstruction.     LYMPH NODES: Normal.    VASCULATURE: Unremarkable.    PELVIC ORGANS: Normal.    OTHER: None.    MUSCULOSKELETAL: Degenerative changes of the spine. No acute osseous  abnormality.      Impression    IMPRESSION:   1.  No acute intra-abdominal or intrapelvic process.  2.  No KUB calculi or hydronephrosis.  3.  Hepatic steatosis.  4.  Colonic diverticulosis without signs of diverticulitis.    JAIME DAI  MD LAURENT         SYSTEM ID:  [id number]            Assessment and Plan:     Assessment: 58 year old female seen in evaluation for suprapubic pain that is worse with movement, urinary frequency with small volume voids, nausea, and low grade fevers.     She had a CT abdomen pelvis without contrast on 3/22/2023 which noted an unremarkable urinary tract. UA from 3/10/2023 noted 2-5 RBCs, urine culture was negative. Repeat UA today noted no RBCs.     We discussed possible causes for her urinary symptoms including pelvic floor hypercontractility and overactive bladder. This would not explain the nausea or low grade fevers. The patient had a cousin who was found to have a urethral diverticulum and her symptoms were similar to the patient's. The patient would prefer to proceed with cystoscopy for further evaluation.     Plan:  Cystoscopy for microscopic hematuria and ongoing pelvic pain.     VIOLETTE BECKHAM PA-C  Department of Urology

## 2023-04-11 NOTE — NURSING NOTE
"Initial BP (!) 157/91   Pulse 92   Temp 99.4  F (37.4  C) (Tympanic)   SpO2 96%  Estimated body mass index is 44.89 kg/m  as calculated from the following:    Height as of 3/10/23: 1.753 m (5' 9\").    Weight as of 3/10/23: 137.9 kg (304 lb). .    Active order to obtain bladder scan? Yes   Name of ordering provider:  Therese Fairchild  Bladder scan preformed post void Yes.  Bladder scan reveled 100ML  Provider notified?  Yes    Ghada Romero CMA          "

## 2023-04-12 ENCOUNTER — PRE VISIT (OUTPATIENT)
Dept: UROLOGY | Facility: CLINIC | Age: 58
End: 2023-04-12
Payer: COMMERCIAL

## 2023-04-12 LAB — BACTERIA UR CULT: NORMAL

## 2023-04-12 NOTE — TELEPHONE ENCOUNTER
Reason for Visit: Cystoscopy    Diagnosis: microscopic hematuria and ongoing pelvic pain    Orders/Procedures/Records: in system    Contact Patient: n/a    Rooming Requirements: UA dip prior to getting ready for cystoscopy. If positive for Leuks and/or Nitrites, check with provider.      Adrienne Chan LPN  04/12/23  2:32 PM

## 2023-04-14 ENCOUNTER — OFFICE VISIT (OUTPATIENT)
Dept: UROLOGY | Facility: CLINIC | Age: 58
End: 2023-04-14
Payer: COMMERCIAL

## 2023-04-14 VITALS
DIASTOLIC BLOOD PRESSURE: 91 MMHG | WEIGHT: 293 LBS | HEART RATE: 54 BPM | HEIGHT: 69 IN | SYSTOLIC BLOOD PRESSURE: 147 MMHG | BODY MASS INDEX: 43.4 KG/M2

## 2023-04-14 DIAGNOSIS — R39.89 BLADDER PAIN: ICD-10-CM

## 2023-04-14 DIAGNOSIS — R10.2 PELVIC PAIN IN FEMALE: ICD-10-CM

## 2023-04-14 DIAGNOSIS — R31.29 MICROSCOPIC HEMATURIA: Primary | ICD-10-CM

## 2023-04-14 LAB
ALBUMIN UR-MCNC: NEGATIVE MG/DL
APPEARANCE UR: CLEAR
BILIRUB UR QL STRIP: NEGATIVE
COLOR UR AUTO: YELLOW
GLUCOSE UR STRIP-MCNC: NEGATIVE MG/DL
HGB UR QL STRIP: NEGATIVE
KETONES UR STRIP-MCNC: NEGATIVE MG/DL
LEUKOCYTE ESTERASE UR QL STRIP: NEGATIVE
NITRATE UR QL: NEGATIVE
PH UR STRIP: 5.5 [PH] (ref 5–8)
SP GR UR STRIP: 1.02 (ref 1–1.03)
UROBILINOGEN UR STRIP-ACNC: 0.2 E.U./DL

## 2023-04-14 PROCEDURE — 81003 URINALYSIS AUTO W/O SCOPE: CPT | Performed by: PATHOLOGY

## 2023-04-14 PROCEDURE — 52000 CYSTOURETHROSCOPY: CPT | Performed by: OBSTETRICS & GYNECOLOGY

## 2023-04-14 RX ORDER — LIDOCAINE HYDROCHLORIDE 20 MG/ML
JELLY TOPICAL ONCE
Status: DISCONTINUED | OUTPATIENT
Start: 2023-04-14 | End: 2023-07-27

## 2023-04-14 ASSESSMENT — PAIN SCALES - GENERAL: PAINLEVEL: MILD PAIN (2)

## 2023-04-14 NOTE — LETTER
"2023       RE: Alena Ferro  [address] [facility] [address]     Dear Colleague,    Thank you for referring your patient, Alena Ferro, to the [facility]. Please see a copy of my visit note below.    Chief Complaint   Patient presents with    Cystoscopy       Blood pressure (!) 147/101, pulse 54, height 1.753 m (5' 9\"), weight 137.9 kg (304 lb), not currently breastfeeding. Body mass index is 44.89 kg/m .    Patient Active Problem List   Diagnosis    ADHD (attention deficit hyperactivity disorder), inattentive type    Intestinal bacterial overgrowth    Screening for malignant neoplasm of cervix    Shellfish allergy    Morbid obesity (H)       Allergies   Allergen Reactions    Shellfish Containing Products [Shellfish-Derived Products] Hives     Happens when eating [place] in [place]  No respiratory symptoms to date.     Amoxicillin Hives       Current Outpatient Medications   Medication Sig Dispense Refill    valACYclovir (VALTREX) 1000 MG tablet Takes prn         Social History     Tobacco Use    Smoking status: Never    Smokeless tobacco: Never   Vaping Use    Vaping status: Never Used   Substance Use Topics    Drug use: Never       Invasive Procedure Safety Checklist:    Procedure: Cystoscopy    Action: Complete sections and checkboxes as appropriate.    Pre-procedure:  1. Patient ID Verified with 2 identifiers (Marissa and  or MRN) : YES    2. Procedure and site verified with patient/designee (when able) : YES    3. Accurate consent documentation in medical record : YES    4. H&P (or appropriate assessment) documented in medical record : N/A  H&P must be up to 30 days prior to procedure an updated within 24 hours of                 Procedure as applicable.     5. Relevant diagnostic and radiology test results appropriately labeled and displayed as applicable : YES    6. Blood products, " implants, devices, and/or special equipment available for the procedure as applicable : YES    7. Procedure site(s) marked with provider initials [Exclusions: none] : NO    8. Marking not required. Reason : Yes  Procedure does not require site marking    Time Out:     Time-Out performed immediately prior to starting procedure, including verbal and active participation of all team members addressing: YES    1. Correct patient identity.  2. Confirmed that the correct side and site are marked.  3. An accurate procedure to be done.  4. Agreement on the procedure to be done.  5. Correct patient position.  6. Relevant images and results are properly labeled and appropriately displayed.  7. The need to administer antibiotics or fluids for irrigation purposes during the procedure as applicable.  8. Safety precautions based on patient history or medication use.    During Procedure: Verification of correct person, site, and procedure occurs any time the responsibility for care of the patient is transferred to another member of the care team.        The following medication was given:     MEDICATION:  Lidocaine without epinephrine 2% jelly  ROUTE: urethral   SITE: urethral   DOSE: 10 mL  LOT #: [id number]  : [employer]  EXPIRATION DATE: 9/23  NDC#: [address]-4  Was there drug waste? No    Prior to med admin, verified patient identity using patient's name and date of birth.  Due to med administration, patient instructed to remain in clinic for 15 minutes  afterwards, and to report any adverse reaction to me immediately.    Drug Amount Wasted:  None.  Vial/Syringe: Syringe      Carina Shin  4/14/2023    Reason for Visit:  Cystoscopy    Clinical Data: Ms. Alena Ferro is a 58 year old female with a hx of pelvic pain    Cystoscopy procedure:  Pt. Was consented and placed in the lithotomy position.  She was cleaned and preparred in the usual fashion.  Lidocain gel was inserted into  the urethra and given time to take effect.  A 16 fr flexible cystoscope was then inserted through the urethra and into the bladder.  The urethra was wnl.  The bladder was without trabeculation.  No tumors, diverticulae, or stones.  Bilateral u/o's were effluxing clear urine.  The cystoscope was then withdrawn.  The pt. Tolerated the procedure well.    A/P:  58 year old female with pelvic pain  -F/U with her OB/GYN    Thank you for allowing me to participate in the care of  Ms. Alena Ferro and I will keep you updated on her progress.    Cordell Chavarria MD

## 2023-04-14 NOTE — PROGRESS NOTES
Reason for Visit:  Cystoscopy    Clinical Data: Ms. Alena Ferro is a 58 year old female with a hx of pelvic pain    Cystoscopy procedure:  Pt. Was consented and placed in the lithotomy position.  She was cleaned and preparred in the usual fashion.  Lidocain gel was inserted into the urethra and given time to take effect.  A 16 fr flexible cystoscope was then inserted through the urethra and into the bladder.  The urethra was wnl.  The bladder was without trabeculation.  No tumors, diverticulae, or stones.  Bilateral u/o's were effluxing clear urine.  The cystoscope was then withdrawn.  The pt. Tolerated the procedure well.    A/P:  58 year old female with pelvic pain  -F/U with her OB/GYN    Thank you for allowing me to participate in the care of  Ms. Alena Ferro and I will keep you updated on her progress.    Cordell Chavarria MD

## 2023-04-14 NOTE — PROGRESS NOTES
"Chief Complaint   Patient presents with     Cystoscopy       Blood pressure (!) 147/101, pulse 54, height 1.753 m (5' 9\"), weight 137.9 kg (304 lb), not currently breastfeeding. Body mass index is 44.89 kg/m .    Patient Active Problem List   Diagnosis     ADHD (attention deficit hyperactivity disorder), inattentive type     Intestinal bacterial overgrowth     Screening for malignant neoplasm of cervix     Shellfish allergy     Morbid obesity (H)       Allergies   Allergen Reactions     Shellfish Containing Products [Shellfish-Derived Products] Hives     Happens when eating [place] in [place]  No respiratory symptoms to date.      Amoxicillin Hives       Current Outpatient Medications   Medication Sig Dispense Refill     valACYclovir (VALTREX) 1000 MG tablet Takes prn         Social History     Tobacco Use     Smoking status: Never     Smokeless tobacco: Never   Vaping Use     Vaping status: Never Used   Substance Use Topics     Drug use: Never       Invasive Procedure Safety Checklist:    Procedure: Cystoscopy    Action: Complete sections and checkboxes as appropriate.    Pre-procedure:  1. Patient ID Verified with 2 identifiers (Marissa and  or MRN) : YES    2. Procedure and site verified with patient/designee (when able) : YES    3. Accurate consent documentation in medical record : YES    4. H&P (or appropriate assessment) documented in medical record : N/A  H&P must be up to 30 days prior to procedure an updated within 24 hours of                 Procedure as applicable.     5. Relevant diagnostic and radiology test results appropriately labeled and displayed as applicable : YES    6. Blood products, implants, devices, and/or special equipment available for the procedure as applicable : YES    7. Procedure site(s) marked with provider initials [Exclusions: none] : NO    8. Marking not required. Reason : Yes  Procedure does not require site marking    Time Out:     Time-Out performed immediately prior to " starting procedure, including verbal and active participation of all team members addressing: YES    1. Correct patient identity.  2. Confirmed that the correct side and site are marked.  3. An accurate procedure to be done.  4. Agreement on the procedure to be done.  5. Correct patient position.  6. Relevant images and results are properly labeled and appropriately displayed.  7. The need to administer antibiotics or fluids for irrigation purposes during the procedure as applicable.  8. Safety precautions based on patient history or medication use.    During Procedure: Verification of correct person, site, and procedure occurs any time the responsibility for care of the patient is transferred to another member of the care team.        The following medication was given:     MEDICATION:  Lidocaine without epinephrine 2% jelly  ROUTE: urethral   SITE: urethral   DOSE: 10 mL  LOT #: [id number]  : [employer]  EXPIRATION DATE: 9/23  NDC#: [address]-0  Was there drug waste? No    Prior to med admin, verified patient identity using patient's name and date of birth.  Due to med administration, patient instructed to remain in clinic for 15 minutes  afterwards, and to report any adverse reaction to me immediately.    Drug Amount Wasted:  None.  Vial/Syringe: Syringe      Carina Shin  4/14/2023

## 2023-04-24 ENCOUNTER — OFFICE VISIT (OUTPATIENT)
Dept: OBGYN | Facility: CLINIC | Age: 58
End: 2023-04-24
Payer: COMMERCIAL

## 2023-04-24 VITALS
TEMPERATURE: 98.7 F | BODY MASS INDEX: 45.04 KG/M2 | DIASTOLIC BLOOD PRESSURE: 90 MMHG | WEIGHT: 293 LBS | SYSTOLIC BLOOD PRESSURE: 142 MMHG

## 2023-04-24 DIAGNOSIS — R10.2 PELVIC PAIN IN FEMALE: Primary | ICD-10-CM

## 2023-04-24 DIAGNOSIS — Z12.11 COLON CANCER SCREENING: ICD-10-CM

## 2023-04-24 PROCEDURE — 99203 OFFICE O/P NEW LOW 30 MIN: CPT | Performed by: OBSTETRICS & GYNECOLOGY

## 2023-04-24 NOTE — NURSING NOTE
"Initial BP (!) 142/90 (BP Location: Left arm, Patient Position: Chair, Cuff Size: Adult Large)   Temp 98.7  F (37.1  C) (Tympanic)   Wt 138.3 kg (305 lb)   BMI 45.04 kg/m   Estimated body mass index is 45.04 kg/m  as calculated from the following:    Height as of 4/14/23: 1.753 m (5' 9\").    Weight as of this encounter: 138.3 kg (305 lb). .    Yaritza Chu MA    "

## 2023-04-24 NOTE — PROGRESS NOTES
Gynecology Consult Note      HPI: Alena Ferro is a 58 year old P0 who presents for pelvic pain.  The patient states that it has been daily for 10 weeks.  Worsened with activity.  Notes that it is keeping her from doing her job.  She initially felt that it was bladder related plan.  Was treated presumptively for UTI without improvement.  Subsequently seen by physician who referred her to urology.  She has undergone cystoscopy and urology cannot find any urologic cause for her symptoms.  She did have CT scan which did not show any obvious concerning pathology.  Diverticulum were noted.  She states she went through menopause at age 50, no bleeding since that time.  Has had a couple of night sweats recently.  Has noted nausea and decreased appetite.  No significant weight loss.  States that she has regular, daily bowel movements.  Does not report any blood in her stools.  Has never had a colonoscopy.  Has done FIT tests a couple of times.  Remote history of abnormal Pap, subsequent Pap smears have been normal.  No previous abdominal surgeries.  Denies abnormal discharge.    ROS: 10 pt ROS neg other than HPI    PMH:   Past Medical History:   Diagnosis Date     ADHD      Breast cyst      Ear pain     sharp pains     HSV-2 infection      Tinnitus        PSHx:   No past surgical history on file.    Medications:   valACYclovir (VALTREX) 1000 MG tablet, Takes prn    lidocaine (XYLOCAINE) 2 % external gel         Allergies:      Allergies   Allergen Reactions     Shellfish Containing Products [Shellfish-Derived Products] Hives     Happens when eating [place] in [place]  No respiratory symptoms to date.      Amoxicillin Hives       Social History:   Social History     Socioeconomic History     Marital status:      Spouse name: Not on file     Number of children: Not on file     Years of education: Not on file     Highest education level: Not on file   Occupational History     Not on file   Tobacco Use      Smoking status: Never     Smokeless tobacco: Never   Vaping Use     Vaping status: Never Used   Substance and Sexual Activity     Alcohol use: Not on file     Drug use: Never     Sexual activity: Not on file   Other Topics Concern     Not on file   Social History Narrative     Not on file     Social Determinants of Health     Financial Resource Strain: Not on file   Food Insecurity: Not on file   Transportation Needs: Not on file   Physical Activity: Not on file   Stress: Not on file   Social Connections: Not on file   Intimate Partner Violence: Not on file   Housing Stability: Not on file       Family History:  Family History   Problem Relation Age of Onset     Breast Cancer Mother 60.00     No Known Problems Father        Physical Exam:   Vitals:    04/24/23 0905   BP: (!) 142/90   BP Location: Left arm   Patient Position: Chair   Cuff Size: Adult Large   Temp: 98.7  F (37.1  C)   TempSrc: Tympanic   Weight: 138.3 kg (305 lb)      Gen: lying in bed, NAD  CV: Reg rate, well perfused  Pulm: no increased work of breathing  Abd: non-tender, non-distended, no masses   Pelvis: atrophic, normal appearing external genitalia, vaginal mucosa, cervix, bimanual exam with normal size and contour of uterus with no adnexal masses exam limited by BMI  Extremities: non-tender, no erythema; no edema  Psych: normal mood and affect  Neuro: no focal deficits      Imaging:  CT abd/pelvis    LOWER CHEST: Normal.     HEPATOBILIARY: Diffuse hepatic steatosis. No significant mass. No bile  duct dilatation. No calcified gallstones.     PANCREAS: Normal.     SPLEEN: Normal.     ADRENAL GLANDS: Normal.     KIDNEYS/BLADDER: No significant mass, stones, or hydronephrosis.     BOWEL: Diverticulosis in the colon. No acute inflammatory change. No  obstruction.      LYMPH NODES: Normal.     VASCULATURE: Unremarkable.     PELVIC ORGANS: Normal.     OTHER: None.     MUSCULOSKELETAL: Degenerative changes of the spine. No acute  osseous  abnormality.                                                                      IMPRESSION:   1.  No acute intra-abdominal or intrapelvic process.  2.  No KUB calculi or hydronephrosis.  3.  Hepatic steatosis.  4.  Colonic diverticulosis without signs of diverticulitis.       A&P: Alena Ferro is a 58 year old P0 who presents with pelvic pain.  Discussed with patient that the etiology of her pelvic pain remains unclear.  She did have recent CT scan completed which did not show any obvious pathology of the uterus or ovaries.  However discussed with patient recommendation to obtain a pelvic ultrasound to more closely evaluate uterus and ovaries for any abnormalities.  Exam today is benign but limited by BMI.  Discussed with patient recommendation to obtain a colonoscopy as she has not done this in the past and at least with initial CT imaging there is not any obvious structural cause with gynecologic organs to explain her discomfort.  Patient states understanding and agreement.  Final plan of care pending results of pelvic ultrasound and colonoscopy.    I spent a total of 40 minutes reviewing chart, obtaining history, counseling, examining, coordinating care, documenting this encounter.  Eli Manzano MD   4/24/2023 9:29 AM

## 2023-04-27 ENCOUNTER — HOSPITAL ENCOUNTER (OUTPATIENT)
Dept: ULTRASOUND IMAGING | Facility: CLINIC | Age: 58
Discharge: HOME | End: 2023-04-27
Attending: OBSTETRICS & GYNECOLOGY | Admitting: OBSTETRICS & GYNECOLOGY
Payer: COMMERCIAL

## 2023-04-27 DIAGNOSIS — R10.2 PELVIC PAIN IN FEMALE: ICD-10-CM

## 2023-04-27 PROCEDURE — 76830 TRANSVAGINAL US NON-OB: CPT

## 2023-05-01 ENCOUNTER — TELEPHONE (OUTPATIENT)
Dept: FAMILY MEDICINE | Facility: CLINIC | Age: 58
End: 2023-05-01
Payer: COMMERCIAL

## 2023-05-01 NOTE — LETTER
May 1, 2023      Alena Ferro  [address] [facility] [address]      Your healthcare team cares about your health. To provide you with the best care, we have reviewed your chart and based on our findings, we see that you are due to:     Schedule Annual MAMMOGRAPHY. [facility] scheduling number is [phone] or schedule in [facility] (self referral).  If you are under/uninsured, we recommend you contact the [facility]. They offer mammograms at no charge or on a sliding fee charge. You can schedule with them at [phone]. Please have them send us the results.   Colon cancer is now the second leading cause of cancer-related deaths in the [place] for both men and women and there are over 130,000 new cases and 50,000 deaths per year from colon cancer. Colonoscopies can prevent 90-95% of these deaths. Problem lesions can be removed before they ever become cancer. This test is not only looking for cancer, but also getting rid of precancerous lesions.   If you are under/uninsured, we recommend you contact the [employer] [facility].[employer] is a free colorectal cancer screening program that provides colonoscopies for eligible under/uninsured [place] men and women. If you are interested in receiving a free colonoscopy, please call [employer] at t [phone] (mention code [employer]) to see if you're eligible. Please have them send us the results.     If you have already completed these items, please contact the clinic via phone or [facility] so your care team can review and update your records.  Thank you for choosing [facility] for your healthcare needs. For any questions, concerns, or to schedule an appointment please contact the clinic.       Healthy Regards,      Your [facility] Care Team

## 2023-05-01 NOTE — TELEPHONE ENCOUNTER
Patient Quality Outreach    Patient is due for the following:   Colon Cancer Screening  Breast Cancer Screening - Mammogram    Next Steps:   schedule mammogram and colonoscopy    Type of outreach:    Sent letter.      Questions for provider review:    None     Eli Velasquez MA

## 2023-05-05 ENCOUNTER — ANESTHESIA EVENT (OUTPATIENT)
Dept: SURGERY | Facility: CLINIC | Age: 58
End: 2023-05-05
Payer: COMMERCIAL

## 2023-05-05 ENCOUNTER — OFFICE VISIT (OUTPATIENT)
Dept: OBGYN | Facility: CLINIC | Age: 58
End: 2023-05-05
Payer: COMMERCIAL

## 2023-05-05 ENCOUNTER — OFFICE VISIT (OUTPATIENT)
Dept: FAMILY MEDICINE | Facility: CLINIC | Age: 58
End: 2023-05-05
Payer: COMMERCIAL

## 2023-05-05 ENCOUNTER — PREP FOR PROCEDURE (OUTPATIENT)
Dept: OBGYN | Facility: CLINIC | Age: 58
End: 2023-05-05

## 2023-05-05 ENCOUNTER — TELEPHONE (OUTPATIENT)
Dept: OBGYN | Facility: CLINIC | Age: 58
End: 2023-05-05

## 2023-05-05 VITALS
RESPIRATION RATE: 18 BRPM | SYSTOLIC BLOOD PRESSURE: 139 MMHG | HEART RATE: 88 BPM | HEIGHT: 69 IN | TEMPERATURE: 98.8 F | WEIGHT: 293 LBS | BODY MASS INDEX: 43.4 KG/M2 | DIASTOLIC BLOOD PRESSURE: 80 MMHG

## 2023-05-05 VITALS
HEIGHT: 69 IN | TEMPERATURE: 98.3 F | DIASTOLIC BLOOD PRESSURE: 86 MMHG | SYSTOLIC BLOOD PRESSURE: 138 MMHG | HEART RATE: 72 BPM | WEIGHT: 293 LBS | OXYGEN SATURATION: 97 % | BODY MASS INDEX: 43.4 KG/M2 | RESPIRATION RATE: 16 BRPM

## 2023-05-05 DIAGNOSIS — R10.2 PELVIC PAIN: Primary | ICD-10-CM

## 2023-05-05 DIAGNOSIS — E66.813 CLASS 3 SEVERE OBESITY WITHOUT SERIOUS COMORBIDITY WITH BODY MASS INDEX (BMI) OF 45.0 TO 49.9 IN ADULT, UNSPECIFIED OBESITY TYPE (H): ICD-10-CM

## 2023-05-05 DIAGNOSIS — R10.2 PELVIC PAIN IN FEMALE: ICD-10-CM

## 2023-05-05 DIAGNOSIS — R93.89 THICKENED ENDOMETRIUM: ICD-10-CM

## 2023-05-05 DIAGNOSIS — R10.2 PELVIC PAIN IN FEMALE: Primary | ICD-10-CM

## 2023-05-05 DIAGNOSIS — E66.01 CLASS 3 SEVERE OBESITY WITHOUT SERIOUS COMORBIDITY WITH BODY MASS INDEX (BMI) OF 45.0 TO 49.9 IN ADULT, UNSPECIFIED OBESITY TYPE (H): ICD-10-CM

## 2023-05-05 DIAGNOSIS — Z01.818 PREOP GENERAL PHYSICAL EXAM: Primary | ICD-10-CM

## 2023-05-05 PROCEDURE — 58100 BIOPSY OF UTERUS LINING: CPT | Mod: 52 | Performed by: OBSTETRICS & GYNECOLOGY

## 2023-05-05 PROCEDURE — 99215 OFFICE O/P EST HI 40 MIN: CPT | Mod: 25 | Performed by: OBSTETRICS & GYNECOLOGY

## 2023-05-05 PROCEDURE — 99213 OFFICE O/P EST LOW 20 MIN: CPT | Performed by: STUDENT IN AN ORGANIZED HEALTH CARE EDUCATION/TRAINING PROGRAM

## 2023-05-05 RX ORDER — ACETAMINOPHEN 325 MG/1
975 TABLET ORAL ONCE
Status: CANCELLED | OUTPATIENT
Start: 2023-05-05 | End: 2023-05-05

## 2023-05-05 ASSESSMENT — PAIN SCALES - GENERAL: PAINLEVEL: NO PAIN (1)

## 2023-05-05 NOTE — TELEPHONE ENCOUNTER
"[id number]  Alena Ferro    You are now scheduled for surgery at [facility].  Below are the details for your surgery.  Please read the \"Preparing for Your Surgery\" instructions and let us know if you have any questions.    Type of surgery: HYSTEROSCOPY, DIAGNOSTIC, WITH DILATION AND CURETTAGE OF UTERUS, endometrial sampling with myosure     Surgeon:  Eli Manzano MD  Location of surgery: [facility] OR    Date of surgery: 5-8-23    Time: 12:00pm   Arrival Time: 11:00am    Time can change, to be confirmed a couple of days prior by pre-op surgery nurse.    Pre-Op Appt Date: Patient to schedule with a PCP or Family Practice Provider within 30 days to the surgery.  Post-Op Appt Date:       Time:     Packet sent out: Yes  Pre-cert/Authorization completed:  TBD by Financial Securing Office.   MA Sterilization/Hysterectomy Acknowledgment Consent signed: Not Applicable    [facility]  [phone]    Fax: [phone]  Same Day Surgery [phone]  Fax: [phone]  [facility] [phone]    "

## 2023-05-05 NOTE — H&P (VIEW-ONLY)
[facility]  [address] [place] [id number]  Phone: [phone]  Fax: [phone]  Primary Provider: Jaclyn Pena  Pre-op Performing Provider: DANNIE HERNÁNDEZ      PREOPERATIVE EVALUATION:  Today's date: 5/5/2023    Alena Ferro is a 58 year old female who presents for a preoperative evaluation.    Surgical Information:  Surgery/Procedure: HYSTEROSCOPY, DIAGNOSTIC, WITH DILATION AND CURETTAGE OF UTERUS, endometrial sampling with myosure  Surgery Location: [facility]  Surgeon:   Surgery Date: 05/08/2023  Time of Surgery: noon  Where patient plans to recover: At home with family  Fax number for surgical facility: Note does not need to be faxed, will be available electronically in Epic.    Assessment & Plan     The proposed surgical procedure is considered LOW risk.    Preop general physical exam  Thickened endometrium  Pelvic pain in female  Patient is a pleasant 58 year old who presents for abdominal pain. She was seen in OB today with attempted endometrial biopsy but unable to obtain due to stenosis of the cervix. Plan for anesthetized exam on Monday. Other than chronic abdominal pain, no other symptoms or concerns today.     Reviewed CBC and BMP from March 2023 with normal hemoglobin and GFR in the 60's.     Class 3 severe obesity without serious comorbidity with body mass index (BMI) of 45.0 to 49.9 in adult, unspecified obesity type (H)  Only risk factor is elevated BMI. No comorbidities.        Risks and Recommendations:  The patient has the following additional risks and recommendations for perioperative complications:   - Morbid obesity (BMI >40)    Antiplatelet or Anticoagulation Medication Instructions:   - Patient is on no antiplatelet or anticoagulation medications.    Additional Medication Instructions:  Patient is on no additional chronic medications    RECOMMENDATION:  APPROVAL GIVEN to proceed with proposed procedure,  without further diagnostic evaluation.    Lynn Eid MD     Subjective     HPI related to upcoming procedure:   Chronic pelvic pain.   Per OB documentation today, unable to obtain EMB due to stenosis of cervix.         5/5/2023     1:36 PM   Preop Questions   1. Have you ever had a heart attack or stroke? No   2. Have you ever had surgery on your heart or blood vessels, such as a stent placement, a coronary artery bypass, or surgery on an artery in your head, neck, heart, or legs? No   3. Do you have chest pain with activity? No   4. Do you have a history of  heart failure? No   5. Do you currently have a cold, bronchitis or symptoms of other infection? No   6. Do you have a cough, shortness of breath, or wheezing? No   7. Do you or anyone in your family have previous history of blood clots? No   8. Do you or does anyone in your family have a serious bleeding problem such as prolonged bleeding following surgeries or cuts? No   9. Have you ever had problems with anemia or been told to take iron pills? No   10. Have you had any abnormal blood loss such as black, tarry or bloody stools, or abnormal vaginal bleeding? No   11. Have you ever had a blood transfusion? No   12. Are you willing to have a blood transfusion if it is medically needed before, during, or after your surgery? Yes   13. Have you or any of your relatives ever had problems with anesthesia? No   14. Do you have sleep apnea, excessive snoring or daytime drowsiness? No   15. Do you have any artifical heart valves or other implanted medical devices like a pacemaker, defibrillator, or continuous glucose monitor? No   16. Do you have artificial joints? No   17. Are you allergic to latex? No   18. Is there any chance that you may be pregnant? No       Health Care Directive:  Patient does not have a Health Care Directive or Living Will: Discussed advance care planning with patient; however, patient declined at this time.    Preoperative Review of  :   reviewed - no record of controlled substances prescribed.      Review of Systems  CONSTITUTIONAL: NEGATIVE for fever, chills, change in weight  INTEGUMENTARY/SKIN: NEGATIVE for worrisome rashes, moles or lesions  EYES: NEGATIVE for vision changes or irritation  ENT/MOUTH: NEGATIVE for ear, mouth and throat problems  RESP: NEGATIVE for significant cough or SOB  CV: NEGATIVE for chest pain, palpitations or peripheral edema  GI: POSITIVE for abdominal pain suprapubic and nausea  : NEGATIVE for frequency, dysuria, or hematuria  MUSCULOSKELETAL: NEGATIVE for significant arthralgias or myalgia  NEURO: NEGATIVE for weakness, dizziness or paresthesias  ENDOCRINE: NEGATIVE for temperature intolerance, skin/hair changes  HEME: NEGATIVE for bleeding problems  PSYCHIATRIC: NEGATIVE for changes in mood or affect    Patient Active Problem List    Diagnosis Date Noted     Morbid obesity (H) 03/10/2023     Priority: Medium     ADHD (attention deficit hyperactivity disorder), inattentive type 09/21/2016     Priority: Medium     Screening for malignant neoplasm of cervix 09/07/2016     Priority: Medium     Per visit note dated August 31 , 2016: Hx of abnormal paps: no  2009-2010-2013 NILM  2016 NILM, hpv negative 51 y.o.  PLAN: Co-test 8/2021  ; Pap test history         Shellfish allergy 09/18/2013     Priority: Medium     Intestinal bacterial overgrowth 03/19/2013     Priority: Medium      Past Medical History:   Diagnosis Date     ADHD      Breast cyst      Ear pain     sharp pains     HSV-2 infection      Tinnitus      No past surgical history on file.  Current Outpatient Medications   Medication Sig Dispense Refill     valACYclovir (VALTREX) 1000 MG tablet Takes prn         Allergies   Allergen Reactions     Shellfish Containing Products [Shellfish-Derived Products] Hives     Happens when eating [place] in [place]  No respiratory symptoms to date.      Amoxicillin Hives        Social History     Tobacco Use      "Smoking status: Never     Smokeless tobacco: Never   Vaping Use     Vaping status: Never Used   Substance Use Topics     Alcohol use: Not on file     Family History   Problem Relation Age of Onset     Breast Cancer Mother 60.00     No Known Problems Father      History   Drug Use Unknown         Objective     /86 (BP Location: Right arm, Patient Position: Sitting, Cuff Size: Adult Large)   Pulse 72   Temp 98.3  F (36.8  C) (Tympanic)   Resp 16   Ht 1.753 m (5' 9\")   Wt 138.3 kg (305 lb)   SpO2 97%   BMI 45.04 kg/m      Physical Exam    GENERAL APPEARANCE: healthy, alert and no distress     EYES: EOMI     HENT: ear canals and TM's normal and nose and mouth without ulcers or lesions     NECK: no adenopathy, no asymmetry, masses, or scars     RESP: lungs clear to auscultation - no rales, rhonchi or wheezes     CV: regular rates and rhythm, normal S1 S2, no S3 or S4 and no murmur, click or rub     ABDOMEN:  soft, tender in suprapubic region, no HSM or masses and bowel sounds normal     MS: extremities normal- no gross deformities noted, no evidence of inflammation in joints, FROM in all extremities.     SKIN: no suspicious lesions or rashes     NEURO: Normal strength and tone, sensory exam grossly normal, mentation intact and speech normal     PSYCH: mentation appears normal. and affect normal/bright     LYMPHATICS: No cervical adenopathy    Recent Labs   Lab Test 03/10/23  1655   HGB 14.5         POTASSIUM 3.7   CR 0.96*        Diagnostics:  No labs were ordered during this visit.   No EKG required, no history of coronary heart disease, significant arrhythmia, peripheral arterial disease or other structural heart disease.    Revised Cardiac Risk Index (RCRI):  The patient has the following serious cardiovascular risks for perioperative complications:   - No serious cardiac risks = 0 points     RCRI Interpretation: 0 points: Class I (very low risk - 0.4% complication rate)           Signed " Electronically by: Lynn Eid MD  Copy of this evaluation report is provided to requesting physician.

## 2023-05-05 NOTE — PATIENT INSTRUCTIONS
For informational purposes only. Not to replace the advice of your health care provider. Copyright   2003,  [facility]. All rights reserved. Clinically reviewed by Aaliyah Sanchez MD. Alea [id number] - REV .  Preparing for Your Surgery  Getting started  A nurse will call you to review your health history and instructions. They will give you an arrival time based on your scheduled surgery time. Please be ready to share:    Your doctor's clinic name and phone number    Your medical, surgical, and anesthesia history    A list of allergies and sensitivities    A list of medicines, including herbal treatments and over-the-counter drugs    Whether the patient has a legal guardian (ask how to send us the papers in advance)  Please tell us if you're pregnant--or if there's any chance you might be pregnant. Some surgeries may injure a fetus (unborn baby), so they require a pregnancy test. Surgeries that are safe for a fetus don't always need a test, and you can choose whether to have one.   If you have a child who's having surgery, please ask for a copy of Preparing for Your Child's Surgery.    Preparing for surgery    Within 10 to 30 days of surgery: Have a pre-op exam (sometimes called an H&P, or History and Physical). This can be done at a clinic or pre-operative center.  ? If you're having a , you may not need this exam. Talk to your care team.    At your pre-op exam, talk to your care team about all medicines you take. If you need to stop any medicines before surgery, ask when to start taking them again.  ? We do this for your safety. Many medicines can make you bleed too much during surgery. Some change how well surgery (anesthesia) drugs work.    Call your insurance company to let them know you're having surgery. (If you don't have insurance, call [phone].)    Call your clinic if there's any change in your health. This includes signs of a cold or flu (sore throat, runny nose,  cough, rash, fever). It also includes a scrape or scratch near the surgery site.    If you have questions on the day of surgery, call your hospital or surgery center.  Eating and drinking guidelines  For your safety: Unless your surgeon tells you otherwise, follow the guidelines below.    Eat and drink as usual until 8 hours before you arrive for surgery. After that, no food or milk.    Drink clear liquids until 2 hours before you arrive. These are liquids you can see through, like water, Gatorade, and Propel Water. They also include plain black coffee and tea (no cream or milk), candy, and breath mints. You can spit out gum when you arrive.    If you drink alcohol: Stop drinking it the night before surgery.    If your care team tells you to take medicine on the morning of surgery, it's okay to take it with a sip of water.  Preventing infection    Shower or bathe the night before and morning of your surgery. Follow the instructions your clinic gave you. (If no instructions, use regular soap.)    Don't shave or clip hair near your surgery site. We'll remove the hair if needed.    Don't smoke or vape the morning of surgery. You may chew nicotine gum up to 2 hours before surgery. A nicotine patch is okay.  ? Note: Some surgeries require you to completely quit smoking and nicotine. Check with your surgeon.    Your care team will make every effort to keep you safe from infection. We will:  ? Clean our hands often with soap and water (or an alcohol-based hand rub).  ? Clean the skin at your surgery site with a special soap that kills germs.  ? Give you a special gown to keep you warm. (Cold raises the risk of infection.)  ? Wear special hair covers, masks, gowns and gloves during surgery.  ? Give antibiotic medicine, if prescribed. Not all surgeries need antibiotics.  What to bring on the day of surgery    Photo ID and insurance card    Copy of your health care directive, if you have one    Glasses and hearing aids (bring  cases)  ? You can't wear contacts during surgery    Inhaler and eye drops, if you use them (tell us about these when you arrive)    CPAP machine or breathing device, if you use them    A few personal items, if spending the night    If you have . . .  ? A pacemaker, ICD (cardiac defibrillator) or other implant: Bring the ID card.  ? An implanted stimulator: Bring the remote control.  ? A legal guardian: Bring a copy of the certified (court-stamped) guardianship papers.  Please remove any jewelry, including body piercings. Leave jewelry and other valuables at home.  If you're going home the day of surgery    You must have a responsible adult drive you home. They should stay with you overnight as well.    If you don't have someone to stay with you, and you aren't safe to go home alone, we may keep you overnight. Insurance often won't pay for this.  After surgery  If it's hard to control your pain or you need more pain medicine, please call your surgeon's office.  Questions?   If you have any questions for your care team, list them here: _________________________________________________________________________________________________________________________________________________________________________ ____________________________________ ____________________________________ ____________________________________

## 2023-05-05 NOTE — PROGRESS NOTES
Gynecology Consult Note      HPI: Alena Ferro is a 58 year old who presents for follow up of pelvic pain and review of pelvic US results with plan for EMB. No new interval hx.    ROS: 10 pt ROS neg other than HPI    PMH:   Past Medical History:   Diagnosis Date     ADHD      Breast cyst      Ear pain     sharp pains     HSV-2 infection      Tinnitus        PSHx:   No past surgical history on file.    Medications:   valACYclovir (VALTREX) 1000 MG tablet, Takes prn    lidocaine (XYLOCAINE) 2 % external gel         Allergies:    Allergies   Allergen Reactions     Shellfish Containing Products [Shellfish-Derived Products] Hives     Happens when eating [place] in [place]  No respiratory symptoms to date.      Amoxicillin Hives       Social History: \  Social History     Socioeconomic History     Marital status:      Spouse name: Not on file     Number of children: Not on file     Years of education: Not on file     Highest education level: Not on file   Occupational History     Not on file   Tobacco Use     Smoking status: Never     Smokeless tobacco: Never   Vaping Use     Vaping status: Never Used   Substance and Sexual Activity     Alcohol use: Not on file     Drug use: Never     Sexual activity: Not on file   Other Topics Concern     Not on file   Social History Narrative     Not on file     Social Determinants of Health     Financial Resource Strain: Not on file   Food Insecurity: Not on file   Transportation Needs: Not on file   Physical Activity: Not on file   Stress: Not on file   Social Connections: Not on file   Intimate Partner Violence: Not on file   Housing Stability: Not on file       Family History:  Family History   Problem Relation Age of Onset     Breast Cancer Mother 60.00     No Known Problems Father        Physical Exam:   Vitals:    05/05/23 0903   BP: 139/80   BP Location: Left arm   Patient Position: Chair   Cuff Size: Adult Large   Pulse: 88   Resp: 18   Temp: 98.8  F (37.1  C)  "  TempSrc: Tympanic   Weight: 138.3 kg (305 lb)   Height: 1.753 m (5' 9\")      Gen: lying in bed, NAD  CV: Reg rate, well perfused  Pulm: no increased work of breathing  Abd: non-tender, non-distended, no masses   Pelvis: normal, atrophic appearing external genitalia, vaginal mucosa, cervix, bimanual exam with normal size and contour of uterus with no adnexal masses  Extremities: non-tender, no erythema; no edema  Psych: normal mood and affect  Neuro: no focal deficits    Imaging:  Reviewed images and agree with rads interpretation  UTERUS: 6.2 x 3.6 x 2.6 cm. Normal in size and position with no masses.     ENDOMETRIUM: 7 mm. The endometrium has a mildly heterogeneous appearance and there our questionable small cystic spaces measuring up to 3 mm. Nabothian cyst measuring 6 mm.     RIGHT OVARY: Not well seen due to bowel gas.      LEFT OVARY: Not well seen due to bowel gas.     No significant free fluid.                                                                      IMPRESSION:  1.  The endometrium has a mildly heterogeneous appearance with questionable tiny cystic spaces, best visualized on the cine images. Gynecologic consultation is recommended.  2.  Simple nabothian cyst.  3.  Neither ovary is visualized due to bowel gas.     [facility] Endometrial Biopsy Procedure Note    Alena Ferro  1965  [id number]    The patient was counseled on the risks (including including risk of infection, bleeding, recurrence), benefits, and alternatives of the procedure. Verbal and written consent were obtained.      Technique: The patient was placed in the dorsal lithotomy position.  A speculum was placed in the vagina and the cervix visualized. The cervix was cleaned with betadine swabs x3. Multiple attempts to pass curet, os finder and lactrimal duct probe were not successful in getting past external os. Procedure abandoned.    A&P: Alena Ferro is a 58 year old P0 who presents for follow-up of pelvic " pain, review of imaging results and endometrial biopsy.  Again discussed with the patient that the etiology of her discomfort remains unclear.  Given thickening of endometrial stripe on ultrasound did recommend endometrial biopsy.  This was attempted as above and unfortunately was not successful due to significant cervical stenosis.  Discussed with patient recommendation for sampling under anesthesia.  Discussed hysteroscopy to allow for directed sampling a larger sample given that we will be doing the procedure in the operating room.  Discussed that it remains unclear whether any of her pelvic organs would be source of her pain.  Reviewed strong recommendation for obtaining a colonoscopy to rule out gastrointestinal cause of discomfort.  She has not yet completed any colon cancer screening.  She states she will be agreeable to this should the biopsy returned normal.  She states that if pain is ongoing and not source is found, she may consider hysterectomy.  Discussed with patient that at this time I do not feel convinced that her uterus or pelvic organs are a source for her discomfort and that surgery would certainly pose risk without clear benefit as it is very possible her pain may not change or improve with surgery.  She states understanding and is agreeable with further work-up to try to identify a source of her pain.    I spent a total of 40 minutes reviewing chart, obtaining history, counseling, coordinating care, examining, documenting excluding attempted procedure.    Eli Manzano MD   5/5/2023 10:32 AM

## 2023-05-05 NOTE — NURSING NOTE
"Initial /80 (BP Location: Left arm, Patient Position: Chair, Cuff Size: Adult Large)   Pulse 88   Temp 98.8  F (37.1  C) (Tympanic)   Resp 18   Ht 1.753 m (5' 9\")   Wt 138.3 kg (305 lb)   BMI 45.04 kg/m   Estimated body mass index is 45.04 kg/m  as calculated from the following:    Height as of this encounter: 1.753 m (5' 9\").    Weight as of this encounter: 138.3 kg (305 lb). .      "

## 2023-05-05 NOTE — PROGRESS NOTES
[facility]  [address] [place] [id number]  Phone: [phone]  Fax: [phone]  Primary Provider: Jaclyn Pena  Pre-op Performing Provider: DANNIE HERNÁNDEZ      PREOPERATIVE EVALUATION:  Today's date: 5/5/2023    Alena Ferro is a 58 year old female who presents for a preoperative evaluation.    Surgical Information:  Surgery/Procedure: HYSTEROSCOPY, DIAGNOSTIC, WITH DILATION AND CURETTAGE OF UTERUS, endometrial sampling with myosure  Surgery Location: [facility]  Surgeon:   Surgery Date: 05/08/2023  Time of Surgery: noon  Where patient plans to recover: At home with family  Fax number for surgical facility: Note does not need to be faxed, will be available electronically in Epic.    Assessment & Plan     The proposed surgical procedure is considered LOW risk.    Preop general physical exam  Thickened endometrium  Pelvic pain in female  Patient is a pleasant 58 year old who presents for abdominal pain. She was seen in OB today with attempted endometrial biopsy but unable to obtain due to stenosis of the cervix. Plan for anesthetized exam on Monday. Other than chronic abdominal pain, no other symptoms or concerns today.     Reviewed CBC and BMP from March 2023 with normal hemoglobin and GFR in the 60's.     Class 3 severe obesity without serious comorbidity with body mass index (BMI) of 45.0 to 49.9 in adult, unspecified obesity type (H)  Only risk factor is elevated BMI. No comorbidities.        Risks and Recommendations:  The patient has the following additional risks and recommendations for perioperative complications:   - Morbid obesity (BMI >40)    Antiplatelet or Anticoagulation Medication Instructions:   - Patient is on no antiplatelet or anticoagulation medications.    Additional Medication Instructions:  Patient is on no additional chronic medications    RECOMMENDATION:  APPROVAL GIVEN to proceed with proposed procedure,  without further diagnostic evaluation.    Lynn Eid MD     Subjective     HPI related to upcoming procedure:   Chronic pelvic pain.   Per OB documentation today, unable to obtain EMB due to stenosis of cervix.         5/5/2023     1:36 PM   Preop Questions   1. Have you ever had a heart attack or stroke? No   2. Have you ever had surgery on your heart or blood vessels, such as a stent placement, a coronary artery bypass, or surgery on an artery in your head, neck, heart, or legs? No   3. Do you have chest pain with activity? No   4. Do you have a history of  heart failure? No   5. Do you currently have a cold, bronchitis or symptoms of other infection? No   6. Do you have a cough, shortness of breath, or wheezing? No   7. Do you or anyone in your family have previous history of blood clots? No   8. Do you or does anyone in your family have a serious bleeding problem such as prolonged bleeding following surgeries or cuts? No   9. Have you ever had problems with anemia or been told to take iron pills? No   10. Have you had any abnormal blood loss such as black, tarry or bloody stools, or abnormal vaginal bleeding? No   11. Have you ever had a blood transfusion? No   12. Are you willing to have a blood transfusion if it is medically needed before, during, or after your surgery? Yes   13. Have you or any of your relatives ever had problems with anesthesia? No   14. Do you have sleep apnea, excessive snoring or daytime drowsiness? No   15. Do you have any artifical heart valves or other implanted medical devices like a pacemaker, defibrillator, or continuous glucose monitor? No   16. Do you have artificial joints? No   17. Are you allergic to latex? No   18. Is there any chance that you may be pregnant? No       Health Care Directive:  Patient does not have a Health Care Directive or Living Will: Discussed advance care planning with patient; however, patient declined at this time.    Preoperative Review of  :   reviewed - no record of controlled substances prescribed.      Review of Systems  CONSTITUTIONAL: NEGATIVE for fever, chills, change in weight  INTEGUMENTARY/SKIN: NEGATIVE for worrisome rashes, moles or lesions  EYES: NEGATIVE for vision changes or irritation  ENT/MOUTH: NEGATIVE for ear, mouth and throat problems  RESP: NEGATIVE for significant cough or SOB  CV: NEGATIVE for chest pain, palpitations or peripheral edema  GI: POSITIVE for abdominal pain suprapubic and nausea  : NEGATIVE for frequency, dysuria, or hematuria  MUSCULOSKELETAL: NEGATIVE for significant arthralgias or myalgia  NEURO: NEGATIVE for weakness, dizziness or paresthesias  ENDOCRINE: NEGATIVE for temperature intolerance, skin/hair changes  HEME: NEGATIVE for bleeding problems  PSYCHIATRIC: NEGATIVE for changes in mood or affect    Patient Active Problem List    Diagnosis Date Noted     Morbid obesity (H) 03/10/2023     Priority: Medium     ADHD (attention deficit hyperactivity disorder), inattentive type 09/21/2016     Priority: Medium     Screening for malignant neoplasm of cervix 09/07/2016     Priority: Medium     Per visit note dated August 31 , 2016: Hx of abnormal paps: no  2009-2010-2013 NILM  2016 NILM, hpv negative 51 y.o.  PLAN: Co-test 8/2021  ; Pap test history         Shellfish allergy 09/18/2013     Priority: Medium     Intestinal bacterial overgrowth 03/19/2013     Priority: Medium      Past Medical History:   Diagnosis Date     ADHD      Breast cyst      Ear pain     sharp pains     HSV-2 infection      Tinnitus      No past surgical history on file.  Current Outpatient Medications   Medication Sig Dispense Refill     valACYclovir (VALTREX) 1000 MG tablet Takes prn         Allergies   Allergen Reactions     Shellfish Containing Products [Shellfish-Derived Products] Hives     Happens when eating [place] in [place]  No respiratory symptoms to date.      Amoxicillin Hives        Social History     Tobacco Use      "Smoking status: Never     Smokeless tobacco: Never   Vaping Use     Vaping status: Never Used   Substance Use Topics     Alcohol use: Not on file     Family History   Problem Relation Age of Onset     Breast Cancer Mother 60.00     No Known Problems Father      History   Drug Use Unknown         Objective     /86 (BP Location: Right arm, Patient Position: Sitting, Cuff Size: Adult Large)   Pulse 72   Temp 98.3  F (36.8  C) (Tympanic)   Resp 16   Ht 1.753 m (5' 9\")   Wt 138.3 kg (305 lb)   SpO2 97%   BMI 45.04 kg/m      Physical Exam    GENERAL APPEARANCE: healthy, alert and no distress     EYES: EOMI     HENT: ear canals and TM's normal and nose and mouth without ulcers or lesions     NECK: no adenopathy, no asymmetry, masses, or scars     RESP: lungs clear to auscultation - no rales, rhonchi or wheezes     CV: regular rates and rhythm, normal S1 S2, no S3 or S4 and no murmur, click or rub     ABDOMEN:  soft, tender in suprapubic region, no HSM or masses and bowel sounds normal     MS: extremities normal- no gross deformities noted, no evidence of inflammation in joints, FROM in all extremities.     SKIN: no suspicious lesions or rashes     NEURO: Normal strength and tone, sensory exam grossly normal, mentation intact and speech normal     PSYCH: mentation appears normal. and affect normal/bright     LYMPHATICS: No cervical adenopathy    Recent Labs   Lab Test 03/10/23  1655   HGB 14.5         POTASSIUM 3.7   CR 0.96*        Diagnostics:  No labs were ordered during this visit.   No EKG required, no history of coronary heart disease, significant arrhythmia, peripheral arterial disease or other structural heart disease.    Revised Cardiac Risk Index (RCRI):  The patient has the following serious cardiovascular risks for perioperative complications:   - No serious cardiac risks = 0 points     RCRI Interpretation: 0 points: Class I (very low risk - 0.4% complication rate)           Signed " Electronically by: Lynn Eid MD  Copy of this evaluation report is provided to requesting physician.

## 2023-05-05 NOTE — ANESTHESIA PREPROCEDURE EVALUATION
Anesthesia Pre-Procedure Evaluation    Patient: Alena Ferro   MRN: [id number] : 1965        Procedure : Procedure(s):  HYSTEROSCOPY, DIAGNOSTIC, WITH DILATION AND CURETTAGE OF UTERUS, endometrial sampling with myosure          Past Medical History:   Diagnosis Date     ADHD      Breast cyst      Ear pain     sharp pains     HSV-2 infection      Tinnitus       No past surgical history on file.   Allergies   Allergen Reactions     Shellfish Containing Products [Shellfish-Derived Products] Hives     Happens when eating [place] in [place]  No respiratory symptoms to date.      Amoxicillin Hives      Social History     Tobacco Use     Smoking status: Never     Smokeless tobacco: Never   Vaping Use     Vaping status: Never Used   Substance Use Topics     Alcohol use: Not on file      Wt Readings from Last 1 Encounters:   23 138.3 kg (305 lb)        Anesthesia Evaluation   Pt has had prior anesthetic. Type: General and MAC.        ROS/MED HX  ENT/Pulmonary:     (+) GUDELIA risk factors, obese,     Neurologic:       Cardiovascular:  - neg cardiovascular ROS     METS/Exercise Tolerance:     Hematologic:  - neg hematologic  ROS     Musculoskeletal:  - neg musculoskeletal ROS     GI/Hepatic:  - neg GI/hepatic ROS     Renal/Genitourinary:  - neg Renal ROS     Endo: Comment: Morbid ob    (+) Obesity,     Psychiatric/Substance Use:     (+) psychiatric history other (comment)     Infectious Disease:       Malignancy:  - neg malignancy ROS     Other:            Physical Exam    Airway        Mallampati: II   TM distance: > 3 FB   Neck ROM: full   Mouth opening: > 3 cm    Respiratory Devices and Support         Dental    unable to assess        Cardiovascular   cardiovascular exam normal          Pulmonary   pulmonary exam normal                OUTSIDE LABS:  CBC:   Lab Results   Component Value Date    WBC 6.1 03/10/2023    HGB 14.5 03/10/2023    HCT 43.5 03/10/2023     03/10/2023     BMP:   Lab Results    Component Value Date     03/10/2023    POTASSIUM 3.7 03/10/2023    CHLORIDE 107 03/10/2023    CO2 24 03/10/2023    BUN 12.7 03/10/2023    CR 0.96 (H) 03/10/2023     (H) 03/10/2023     COAGS: No results found for: PTT, INR, FIBR  POC: No results found for: BGM, HCG, HCGS  HEPATIC: No results found for: ALBUMIN, PROTTOTAL, ALT, AST, GGT, ALKPHOS, BILITOTAL, BILIDIRECT, COURT  OTHER:   Lab Results   Component Value Date    PLACIDO 9.4 03/10/2023       Anesthesia Plan    ASA Status:  3      Anesthesia Type: General.   Induction: Intravenous.           Consents    Anesthesia Plan(s) and associated risks, benefits, and realistic alternatives discussed. Questions answered and patient/representative(s) expressed understanding.     - Discussed: Risks, Benefits and Alternatives for the PROCEDURE were discussed     - Discussed with:  Patient         Postoperative Care    Pain management: IV analgesics, Oral pain medications.   PONV prophylaxis: Ondansetron (or other 5HT-3), Dexamethasone or Solumedrol     Comments:                UDAY Snow CRNA

## 2023-05-08 ENCOUNTER — ANESTHESIA (OUTPATIENT)
Dept: SURGERY | Facility: CLINIC | Age: 58
End: 2023-05-08
Payer: COMMERCIAL

## 2023-05-08 ENCOUNTER — HOSPITAL ENCOUNTER (OUTPATIENT)
Facility: CLINIC | Age: 58
Discharge: HOME | End: 2023-05-08
Attending: OBSTETRICS & GYNECOLOGY | Admitting: OBSTETRICS & GYNECOLOGY
Payer: COMMERCIAL

## 2023-05-08 VITALS
WEIGHT: 293 LBS | HEIGHT: 69 IN | DIASTOLIC BLOOD PRESSURE: 70 MMHG | HEART RATE: 69 BPM | BODY MASS INDEX: 43.4 KG/M2 | SYSTOLIC BLOOD PRESSURE: 160 MMHG | RESPIRATION RATE: 18 BRPM | TEMPERATURE: 97.8 F | OXYGEN SATURATION: 95 %

## 2023-05-08 DIAGNOSIS — Z98.890 STATUS POST HYSTEROSCOPY: Primary | ICD-10-CM

## 2023-05-08 LAB — HGB BLD-MCNC: 14.4 G/DL (ref 11.7–15.7)

## 2023-05-08 PROCEDURE — 88305 TISSUE EXAM BY PATHOLOGIST: CPT | Mod: 26 | Performed by: PATHOLOGY

## 2023-05-08 PROCEDURE — 258N000003 HC RX IP 258 OP 636: Performed by: NURSE ANESTHETIST, CERTIFIED REGISTERED

## 2023-05-08 PROCEDURE — 370N000017 HC ANESTHESIA TECHNICAL FEE, PER MIN: Performed by: OBSTETRICS & GYNECOLOGY

## 2023-05-08 PROCEDURE — 36415 COLL VENOUS BLD VENIPUNCTURE: CPT | Performed by: OBSTETRICS & GYNECOLOGY

## 2023-05-08 PROCEDURE — 88305 TISSUE EXAM BY PATHOLOGIST: CPT | Mod: TC | Performed by: OBSTETRICS & GYNECOLOGY

## 2023-05-08 PROCEDURE — 999N000141 HC STATISTIC PRE-PROCEDURE NURSING ASSESSMENT: Performed by: OBSTETRICS & GYNECOLOGY

## 2023-05-08 PROCEDURE — 250N000009 HC RX 250: Performed by: NURSE ANESTHETIST, CERTIFIED REGISTERED

## 2023-05-08 PROCEDURE — 250N000011 HC RX IP 250 OP 636: Performed by: NURSE ANESTHETIST, CERTIFIED REGISTERED

## 2023-05-08 PROCEDURE — 271N000001 HC OR GENERAL SUPPLY NON-STERILE: Performed by: OBSTETRICS & GYNECOLOGY

## 2023-05-08 PROCEDURE — 250N000011 HC RX IP 250 OP 636: Performed by: OBSTETRICS & GYNECOLOGY

## 2023-05-08 PROCEDURE — 272N000001 HC OR GENERAL SUPPLY STERILE: Performed by: OBSTETRICS & GYNECOLOGY

## 2023-05-08 PROCEDURE — 85018 HEMOGLOBIN: CPT | Performed by: OBSTETRICS & GYNECOLOGY

## 2023-05-08 PROCEDURE — 360N000076 HC SURGERY LEVEL 3, PER MIN: Performed by: OBSTETRICS & GYNECOLOGY

## 2023-05-08 PROCEDURE — 710N000012 HC RECOVERY PHASE 2, PER MINUTE: Performed by: OBSTETRICS & GYNECOLOGY

## 2023-05-08 PROCEDURE — 250N000013 HC RX MED GY IP 250 OP 250 PS 637: Performed by: NURSE ANESTHETIST, CERTIFIED REGISTERED

## 2023-05-08 PROCEDURE — 58558 HYSTEROSCOPY BIOPSY: CPT | Performed by: OBSTETRICS & GYNECOLOGY

## 2023-05-08 RX ORDER — LIDOCAINE HYDROCHLORIDE 20 MG/ML
INJECTION, SOLUTION INFILTRATION; PERINEURAL PRN
Status: DISCONTINUED | OUTPATIENT
Start: 2023-05-08 | End: 2023-05-08

## 2023-05-08 RX ORDER — NALOXONE HYDROCHLORIDE 0.4 MG/ML
0.4 INJECTION, SOLUTION INTRAMUSCULAR; INTRAVENOUS; SUBCUTANEOUS
Status: DISCONTINUED | OUTPATIENT
Start: 2023-05-08 | End: 2023-05-08 | Stop reason: HOSPADM

## 2023-05-08 RX ORDER — LIDOCAINE 40 MG/G
CREAM TOPICAL
Status: DISCONTINUED | OUTPATIENT
Start: 2023-05-08 | End: 2023-05-08 | Stop reason: HOSPADM

## 2023-05-08 RX ORDER — ONDANSETRON 4 MG/1
4 TABLET, ORALLY DISINTEGRATING ORAL EVERY 30 MIN PRN
Status: DISCONTINUED | OUTPATIENT
Start: 2023-05-08 | End: 2023-05-08 | Stop reason: HOSPADM

## 2023-05-08 RX ORDER — GABAPENTIN 300 MG/1
300 CAPSULE ORAL
Status: COMPLETED | OUTPATIENT
Start: 2023-05-08 | End: 2023-05-08

## 2023-05-08 RX ORDER — ONDANSETRON 2 MG/ML
INJECTION INTRAMUSCULAR; INTRAVENOUS PRN
Status: DISCONTINUED | OUTPATIENT
Start: 2023-05-08 | End: 2023-05-08

## 2023-05-08 RX ORDER — OXYCODONE HYDROCHLORIDE 5 MG/1
5 TABLET ORAL
Status: DISCONTINUED | OUTPATIENT
Start: 2023-05-08 | End: 2023-05-08 | Stop reason: HOSPADM

## 2023-05-08 RX ORDER — FENTANYL CITRATE 50 UG/ML
INJECTION, SOLUTION INTRAMUSCULAR; INTRAVENOUS PRN
Status: DISCONTINUED | OUTPATIENT
Start: 2023-05-08 | End: 2023-05-08

## 2023-05-08 RX ORDER — NALOXONE HYDROCHLORIDE 0.4 MG/ML
0.2 INJECTION, SOLUTION INTRAMUSCULAR; INTRAVENOUS; SUBCUTANEOUS
Status: DISCONTINUED | OUTPATIENT
Start: 2023-05-08 | End: 2023-05-08 | Stop reason: HOSPADM

## 2023-05-08 RX ORDER — SODIUM CHLORIDE, SODIUM LACTATE, POTASSIUM CHLORIDE, CALCIUM CHLORIDE 600; 310; 30; 20 MG/100ML; MG/100ML; MG/100ML; MG/100ML
INJECTION, SOLUTION INTRAVENOUS CONTINUOUS
Status: DISCONTINUED | OUTPATIENT
Start: 2023-05-08 | End: 2023-05-08 | Stop reason: HOSPADM

## 2023-05-08 RX ORDER — KETOROLAC TROMETHAMINE 30 MG/ML
INJECTION, SOLUTION INTRAMUSCULAR; INTRAVENOUS PRN
Status: DISCONTINUED | OUTPATIENT
Start: 2023-05-08 | End: 2023-05-08

## 2023-05-08 RX ORDER — DEXAMETHASONE SODIUM PHOSPHATE 4 MG/ML
INJECTION, SOLUTION INTRA-ARTICULAR; INTRALESIONAL; INTRAMUSCULAR; INTRAVENOUS; SOFT TISSUE PRN
Status: DISCONTINUED | OUTPATIENT
Start: 2023-05-08 | End: 2023-05-08

## 2023-05-08 RX ORDER — HYDROMORPHONE HCL IN WATER/PF 6 MG/30 ML
0.4 PATIENT CONTROLLED ANALGESIA SYRINGE INTRAVENOUS EVERY 5 MIN PRN
Status: DISCONTINUED | OUTPATIENT
Start: 2023-05-08 | End: 2023-05-08 | Stop reason: HOSPADM

## 2023-05-08 RX ORDER — IBUPROFEN 800 MG/1
800 TABLET, FILM COATED ORAL ONCE
Status: DISCONTINUED | OUTPATIENT
Start: 2023-05-08 | End: 2023-05-08 | Stop reason: HOSPADM

## 2023-05-08 RX ORDER — PROPOFOL 10 MG/ML
INJECTION, EMULSION INTRAVENOUS CONTINUOUS PRN
Status: DISCONTINUED | OUTPATIENT
Start: 2023-05-08 | End: 2023-05-08

## 2023-05-08 RX ORDER — FENTANYL CITRATE 50 UG/ML
25 INJECTION, SOLUTION INTRAMUSCULAR; INTRAVENOUS EVERY 5 MIN PRN
Status: DISCONTINUED | OUTPATIENT
Start: 2023-05-08 | End: 2023-05-08 | Stop reason: HOSPADM

## 2023-05-08 RX ORDER — FENTANYL CITRATE 50 UG/ML
50 INJECTION, SOLUTION INTRAMUSCULAR; INTRAVENOUS EVERY 5 MIN PRN
Status: DISCONTINUED | OUTPATIENT
Start: 2023-05-08 | End: 2023-05-08 | Stop reason: HOSPADM

## 2023-05-08 RX ORDER — ONDANSETRON 2 MG/ML
4 INJECTION INTRAMUSCULAR; INTRAVENOUS EVERY 30 MIN PRN
Status: DISCONTINUED | OUTPATIENT
Start: 2023-05-08 | End: 2023-05-08 | Stop reason: HOSPADM

## 2023-05-08 RX ORDER — KETAMINE HYDROCHLORIDE 10 MG/ML
INJECTION INTRAMUSCULAR; INTRAVENOUS PRN
Status: DISCONTINUED | OUTPATIENT
Start: 2023-05-08 | End: 2023-05-08

## 2023-05-08 RX ORDER — ACETAMINOPHEN 325 MG/1
975 TABLET ORAL ONCE
Status: DISCONTINUED | OUTPATIENT
Start: 2023-05-08 | End: 2023-05-08 | Stop reason: HOSPADM

## 2023-05-08 RX ORDER — ACETAMINOPHEN 325 MG/1
975 TABLET ORAL ONCE
Status: COMPLETED | OUTPATIENT
Start: 2023-05-08 | End: 2023-05-08

## 2023-05-08 RX ORDER — ACETAMINOPHEN 325 MG/1
975 TABLET ORAL ONCE
Status: DISCONTINUED | OUTPATIENT
Start: 2023-05-08 | End: 2023-05-08

## 2023-05-08 RX ORDER — BUPIVACAINE HYDROCHLORIDE 2.5 MG/ML
INJECTION, SOLUTION INFILTRATION; PERINEURAL PRN
Status: DISCONTINUED | OUTPATIENT
Start: 2023-05-08 | End: 2023-05-08 | Stop reason: HOSPADM

## 2023-05-08 RX ORDER — AMOXICILLIN 250 MG
1-2 CAPSULE ORAL 2 TIMES DAILY
Qty: 30 TABLET | Refills: 0 | Status: SHIPPED | OUTPATIENT
Start: 2023-05-08 | End: 2023-06-06

## 2023-05-08 RX ORDER — HYDROMORPHONE HCL IN WATER/PF 6 MG/30 ML
0.2 PATIENT CONTROLLED ANALGESIA SYRINGE INTRAVENOUS EVERY 5 MIN PRN
Status: DISCONTINUED | OUTPATIENT
Start: 2023-05-08 | End: 2023-05-08 | Stop reason: HOSPADM

## 2023-05-08 RX ADMIN — MIDAZOLAM 2 MG: 1 INJECTION INTRAMUSCULAR; INTRAVENOUS at 11:09

## 2023-05-08 RX ADMIN — KETAMINE HYDROCHLORIDE 30 MG: 10 INJECTION, SOLUTION INTRAMUSCULAR; INTRAVENOUS at 11:19

## 2023-05-08 RX ADMIN — KETOROLAC TROMETHAMINE 30 MG: 30 INJECTION, SOLUTION INTRAMUSCULAR at 11:38

## 2023-05-08 RX ADMIN — PROPOFOL 100 MCG/KG/MIN: 10 INJECTION, EMULSION INTRAVENOUS at 11:19

## 2023-05-08 RX ADMIN — ONDANSETRON 4 MG: 2 INJECTION INTRAMUSCULAR; INTRAVENOUS at 11:19

## 2023-05-08 RX ADMIN — ACETAMINOPHEN 975 MG: 325 TABLET ORAL at 10:52

## 2023-05-08 RX ADMIN — LIDOCAINE HYDROCHLORIDE 0.1 ML: 10 INJECTION, SOLUTION EPIDURAL; INFILTRATION; INTRACAUDAL; PERINEURAL at 11:03

## 2023-05-08 RX ADMIN — SODIUM CHLORIDE, POTASSIUM CHLORIDE, SODIUM LACTATE AND CALCIUM CHLORIDE: 600; 310; 30; 20 INJECTION, SOLUTION INTRAVENOUS at 11:02

## 2023-05-08 RX ADMIN — DEXAMETHASONE SODIUM PHOSPHATE 10 MG: 4 INJECTION, SOLUTION INTRA-ARTICULAR; INTRALESIONAL; INTRAMUSCULAR; INTRAVENOUS; SOFT TISSUE at 11:19

## 2023-05-08 RX ADMIN — FENTANYL CITRATE 100 MCG: 50 INJECTION, SOLUTION INTRAMUSCULAR; INTRAVENOUS at 11:09

## 2023-05-08 RX ADMIN — LIDOCAINE HYDROCHLORIDE 100 MG: 20 INJECTION, SOLUTION INFILTRATION; PERINEURAL at 11:18

## 2023-05-08 RX ADMIN — GABAPENTIN 300 MG: 300 CAPSULE ORAL at 10:52

## 2023-05-08 ASSESSMENT — ACTIVITIES OF DAILY LIVING (ADL): ADLS_ACUITY_SCORE: 35

## 2023-05-08 NOTE — OR NURSING
[facility] DISCHARGE NOTE    Patient discharged to home at 12:39 PM via ambulation. Accompanied by mother and staff. Discharge instructions reviewed with patient and mother, opportunity offered to ask questions. Prescriptions sent to patients preferred pharmacy. All belongings sent with patient.    Adilene Giron RN

## 2023-05-08 NOTE — OP NOTE
Operative Note   Name: Alena Ferro  MRN:[id number]  : 1965  Date of Surgery: 2023    Pre-operative Diagnosis: thickened EMS  Post-operative Diagnosis: Same  Procedure(s): hysteroscopy, dilation and curettage, endometrial sampling with myosure    Surgeon: Eli Manzano MD     Anesthesia: MAC  EBL: 10 mL   Urine Output: see anesthesia  Fluids: see anesthesia  Fluid Deficit: 250 mL, NS    Specimens: endometrial curettings  Complications: None apparent.    Findings: EUA revealed anteverted uterus.  Speculum exam revealed normal atrophic external genitalia, vaginal mucosa, and cervix.  Hysteroscopic view showed mass within endometrium arising from right fundus  Ostia were seen.    Indications: Alena Ferro is a 58 year old who presented to clinic with suprapubic pain. US showed thickened EMS and biopsy was recommended. This was attempted in clinic but was unsuccessful due to cervical stenosis and thus above procedure recommended. Risks and benefits of procedure were reviewed with patient, questions were answered appropriately, and consent form was signed with patient.    Procedure: After informed consent was obtained as above, patient was taken to the OR where MAC anesthesia was administered and found to be adequate.  The patient was prepped and draped in sterile fashion in the dorsal lithotomy position. Exam under anesthesia was performed with findings as above.  A medium graves speculum was placed in the vagina 10 cc of 0.25% bupivicaine anesthetic was injected at 4 O'clock and 8 O'clock to achieve paracervical block.  Anterior lip of the cervix was grasped with the single tooth tenaculum. The cervix was then dilated to 7 mm The hysteroscope was then placed under direct visualization.  Finding were noted as described above.   Morcellator was advanced into the hysteroscope device and lining diffusely sampled and mass removed.All of this material was sent to pathology as above.       No other abnormalities were noted. All instruments were removed. Sponge and needle counts were correct x2. The patient tolerated the procedure well and awoke easily in the operating room and was taken to the recovery in stable condition.     Eli Manzano MD   5/8/2023 12:56 PM

## 2023-05-08 NOTE — ANESTHESIA CARE TRANSFER NOTE
Patient: Alena Ferro    Procedure: Procedure(s):  HYSTEROSCOPY, DIAGNOSTIC, WITH DILATION AND CURETTAGE OF UTERUS, endometrial sampling with myosure       Diagnosis: Pelvic pain in female [R10.2]  Thickened endometrium [R93.89]  Diagnosis Additional Information: No value filed.    Anesthesia Type:   General     Note:    Oropharynx: oropharynx clear of all foreign objects  Level of Consciousness: awake  Oxygen Supplementation: face mask    Independent Airway: airway patency satisfactory and stable    Vital Signs Stable: post-procedure vital signs reviewed and stable  Report to RN Given: handoff report given  Patient transferred to: Phase II    Handoff Report: Identifed the Patient, Identified the Reponsible Provider, Reviewed the pertinent medical history, Discussed the surgical course, Reviewed Intra-OP anesthesia mangement and issues during anesthesia, Set expectations for post-procedure period and Allowed opportunity for questions and acknowledgement of understanding      Vitals:  Vitals Value Taken Time   BP     Temp     Pulse     Resp     SpO2         Electronically Signed By: UDAY Guerrero CRNA  May 8, 2023  11:47 AM

## 2023-05-08 NOTE — INTERVAL H&P NOTE
"I have reviewed the surgical (or preoperative) H&P that is linked to this encounter, and examined the patient. There are no significant changes    Clinical Conditions Present on Arrival:  Clinically Significant Risk Factors Present on Admission                  # Severe Obesity: Estimated body mass index is 45.04 kg/m  as calculated from the following:    Height as of this encounter: 1.753 m (5' 9\").    Weight as of this encounter: 138.3 kg (305 lb).       "

## 2023-05-08 NOTE — DISCHARGE INSTRUCTIONS
Same Day Surgery Discharge Instructions  Special Precautions After Surgery - Adult    It is not unusual to feel lightheaded or faint, up to 24 hours after surgery or while taking pain medication.  If you have these symptoms; sit for a few minutes before standing and have someone assist you when getting up.  You should rest and relax for the next 24 hours and must have someone stay with you for at least 24 hours after your discharge.  DO NOT DRIVE any vehicle or operate mechanical equipment for 24 hours following the end of your surgery.  DO NOT DRIVE while taking narcotic pain medications that have been prescribed by your physician.  If you had a limb operated on, you must be able to use it fully to drive.  DO NOT drink alcoholic beverages for 24 hours following surgery or while taking prescription pain medication.  Drink clear liquids (apple juice, ginger ale, broth, 7-Up, etc.).  Progress to your regular diet as you feel able.  Any questions call your physician and do not make important decisions for 24 hours.    Medications:  Acetaminophen (Tylenol):  Next dose: 5:00 pm.  Ibuprofen (Motrin, Advil):  Next dose: 6:00 pm.  Senna - docusate:  Next dose: start this evening.  Follow the instructions on the bottle.    __________________________________________________________________________________________________________________________________  IMPORTANT NUMBERS:    [facility] Main Number:  [phone], [phone]  Pharmacy:  [phone]  Same Day Surgery:  [phone], Monday - Friday until 8:30 p.m.  Urgent Care:  [phone]  Emergency Room:  [phone]       OB Clinic:  [phone]   Nurse Advice Line: [phone]

## 2023-05-08 NOTE — ANESTHESIA POSTPROCEDURE EVALUATION
Patient: Alena Ferro    Procedure: Procedure(s):  HYSTEROSCOPY, DIAGNOSTIC, WITH DILATION AND CURETTAGE OF UTERUS, endometrial sampling with myosure       Anesthesia Type:  General    Note:  Disposition: Outpatient   Postop Pain Control: Uneventful            Sign Out: Well controlled pain   PONV: No   Neuro/Psych: Uneventful            Sign Out: Acceptable/Baseline neuro status   Airway/Respiratory: Uneventful            Sign Out: Acceptable/Baseline resp. status   CV/Hemodynamics: Uneventful            Sign Out: Acceptable CV status; No obvious hypovolemia; No obvious fluid overload   Other NRE: NONE   DID A NON-ROUTINE EVENT OCCUR? No           Last vitals:  Vitals Value Taken Time   /70 05/08/23 1230   Temp 36.6  C (97.8  F) 05/08/23 1230   Pulse 69 05/08/23 1230   Resp 18 05/08/23 1230   SpO2 96 % 05/08/23 1231   Vitals shown include unvalidated device data.    Electronically Signed By: UDAY Guerrero CRNA  May 8, 2023  12:50 PM

## 2023-05-10 LAB
PATH REPORT.COMMENTS IMP SPEC: NORMAL
PATH REPORT.COMMENTS IMP SPEC: NORMAL
PATH REPORT.FINAL DX SPEC: NORMAL
PATH REPORT.GROSS SPEC: NORMAL
PATH REPORT.MICROSCOPIC SPEC OTHER STN: NORMAL
PATH REPORT.RELEVANT HX SPEC: NORMAL
PHOTO IMAGE: NORMAL

## 2023-05-19 ENCOUNTER — VIRTUAL VISIT (OUTPATIENT)
Dept: OBGYN | Facility: CLINIC | Age: 58
End: 2023-05-19
Payer: COMMERCIAL

## 2023-05-19 DIAGNOSIS — R10.2 PELVIC PAIN IN FEMALE: Primary | ICD-10-CM

## 2023-05-19 PROCEDURE — 99213 OFFICE O/P EST LOW 20 MIN: CPT | Mod: 93 | Performed by: OBSTETRICS & GYNECOLOGY

## 2023-05-19 NOTE — PROGRESS NOTES
Gynecology Consult Note    HPI: Alena Ferro is a 58 year old P0 who calls to discuss path results. Continues to have pelvic pain, spotting. Otherwise no new interval hx.     ROS: 10 pt ROS neg other than HPI    PMH:   Past Medical History:   Diagnosis Date     ADHD      Breast cyst      Ear pain     sharp pains     HSV-2 infection      Tinnitus        PSHx:   Past Surgical History:   Procedure Laterality Date     DILATION AND CURETTAGE, HYSTEROSCOPY DIAGNOSTIC, COMBINED N/A 5/8/2023    Procedure: HYSTEROSCOPY, DIAGNOSTIC, WITH DILATION AND CURETTAGE OF UTERUS, endometrial sampling with myosure;  Surgeon: Eli Manzano MD;  Location: [facility] OR       Medications:   senna-docusate (SENOKOT-S/PERICOLACE) 8.6-50 MG tablet, Take 1-2 tablets by mouth 2 times daily (Patient not taking: Reported on 5/19/2023)  valACYclovir (VALTREX) 1000 MG tablet, Takes prn (Patient not taking: Reported on 5/19/2023)    lidocaine (XYLOCAINE) 2 % external gel         Allergies:      Allergies   Allergen Reactions     Shellfish Containing Products [Shellfish-Derived Products] Hives     Happens when eating [place] in [place]  No respiratory symptoms to date.      Amoxicillin Hives       Social History:   Social History     Socioeconomic History     Marital status:      Spouse name: Not on file     Number of children: Not on file     Years of education: Not on file     Highest education level: Not on file   Occupational History     Not on file   Tobacco Use     Smoking status: Never     Smokeless tobacco: Never   Vaping Use     Vaping status: Never Used   Substance and Sexual Activity     Alcohol use: Not on file     Drug use: Never     Sexual activity: Not on file   Other Topics Concern     Not on file   Social History Narrative     Not on file     Social Determinants of Health     Financial Resource Strain: Not on file   Food Insecurity: Not on file   Transportation Needs: Not on file   Physical Activity: Not  on file   Stress: Not on file   Social Connections: Not on file   Intimate Partner Violence: Not on file   Housing Stability: Not on file       Family History:  Family History   Problem Relation Age of Onset     Breast Cancer Mother 60.00     No Known Problems Father        Physical Exam:   Deferred virtual visit    Imaging  Reviewed images and agree with rads interpretation  UTERUS: 6.2 x 3.6 x 2.6 cm. Normal in size and position with no masses.     ENDOMETRIUM: 7 mm. The endometrium has a mildly heterogeneous appearance and there our questionable small cystic spaces measuring up to 3 mm. Nabothian cyst measuring 6 mm.     RIGHT OVARY: Not well seen due to bowel gas.      LEFT OVARY: Not well seen due to bowel gas.     No significant free fluid.                                                                      IMPRESSION:  1.  The endometrium has a mildly heterogeneous appearance with questionable tiny cystic spaces, best visualized on the cine images. Gynecologic consultation is recommended.  2.  Simple nabothian cyst.  3.  Neither ovary is visualized due to bowel gas.    Path:  Endometrium, biopsy-  Fragments of endometrial polyp with associated hyperplastic change without atypia, benign myometrial fragments, no evidence of malignancy      A&P: Alena PRESSLEY Flaquito Roxanna is a 58 year old P0 who calls to discuss pathology results.  Discussed with patient that polyp was found but that given hyperplastic change would manage her as if she were diagnosed with endometrial hyperplasia discussed with patient that without atypical cells she does have several options for management including hysterectomy versus progesterone medical management with repeated endometrial sampling.  The patient states that she continues to have significant pelvic pain and she strongly desires hysterectomy.  Discussed this is reasonable given the hyperplastic change seen in the polyp but that she needs to be aware that I cannot guarantee her pain  is related to the uterus and it may not resolve with hysterectomy.  She states understanding and strongly desires to have surgery.  Reviewed options of leaving ovaries in place versus removal.  She is amenable to removal.  Given parity and lack of descensus on examination recommended that he complete laparoscopic surgery.  Therefore plan will be for right total laparoscopic hysterectomy with bilateral salpingo-oophorectomy.  She will need a preop physical.  Orders will be placed.  Patient states understanding and is in agreement with plan of care.    I spent a total of 10 minutes on the phone with the patient.  I spent a total of 20 minutes reviewing chart, obtaining history, counseling, documenting the encounter.    Eli Manzano MD   5/19/2023 9:34 AM

## 2023-05-19 NOTE — PROGRESS NOTES
Alena is a 58 year old who is being evaluated via a billable telephone visit.      What phone number would you like to be contacted at? [phone]  How would you like to obtain your AVS? Gunnar    Distant Location (provider location):  On-site  Phone call duration: 10 minutes

## 2023-05-23 ENCOUNTER — TELEPHONE (OUTPATIENT)
Dept: OBGYN | Facility: CLINIC | Age: 58
End: 2023-05-23
Payer: COMMERCIAL

## 2023-05-23 NOTE — TELEPHONE ENCOUNTER
Pt calling - looking to schedule Hysterectomy.  Need orders.    Please advise.    Thanks-    -Rach Berg  Clinic Station

## 2023-05-25 ENCOUNTER — PREP FOR PROCEDURE (OUTPATIENT)
Dept: OBGYN | Facility: CLINIC | Age: 58
End: 2023-05-25
Payer: COMMERCIAL

## 2023-05-25 ENCOUNTER — MYC MEDICAL ADVICE (OUTPATIENT)
Dept: OBGYN | Facility: CLINIC | Age: 58
End: 2023-05-25
Payer: COMMERCIAL

## 2023-05-25 DIAGNOSIS — N85.00 ENDOMETRIAL HYPERPLASIA: Primary | ICD-10-CM

## 2023-05-25 RX ORDER — CLINDAMYCIN PHOSPHATE 900 MG/50ML
900 INJECTION, SOLUTION INTRAVENOUS SEE ADMIN INSTRUCTIONS
Status: CANCELLED | OUTPATIENT
Start: 2023-05-25

## 2023-05-25 RX ORDER — PHENAZOPYRIDINE HYDROCHLORIDE 100 MG/1
200 TABLET, FILM COATED ORAL ONCE
Status: CANCELLED | OUTPATIENT
Start: 2023-05-25 | End: 2023-05-25

## 2023-05-25 RX ORDER — CLINDAMYCIN PHOSPHATE 900 MG/50ML
900 INJECTION, SOLUTION INTRAVENOUS
Status: CANCELLED | OUTPATIENT
Start: 2023-05-25

## 2023-05-25 RX ORDER — ACETAMINOPHEN 325 MG/1
975 TABLET ORAL ONCE
Status: CANCELLED | OUTPATIENT
Start: 2023-05-25 | End: 2023-05-25

## 2023-05-25 NOTE — TELEPHONE ENCOUNTER
"[id number]  Alena Ferro    You are now scheduled for surgery at [facility].  Below are the details for your surgery.  Please read the \"Preparing for Your Surgery\" instructions and let us know if you have any questions.    Type of surgery: HYSTERECTOMY, TOTAL, LAPAROSCOPIC with bilateral salpingo-oophorectomy, cystoscopy     Surgeon:  Eli Manzano MD  Location of surgery: [facility] OR    Date of surgery: 6/14/23    Time: 7:30am  Arrival Time: 6:00am    Time can change, to be confirmed a couple of days prior by pre-op surgery nurse.    Pre-Op Appt Date: Patient to schedule with a PCP or Family Practice Provider within 30 days to the surgery.  Post-Op Appt Date:            Time:     Packet sent out: Yes  Pre-cert/Authorization completed:  TBD by Financial Securing Office.   MA Sterilization/Hysterectomy Acknowledgment Consent signed: AM of     [facility]  [phone]    Fax: [phone]  Same Day Surgery [phone]  Fax: [phone]  [facility] [phone]    "

## 2023-06-04 ENCOUNTER — HEALTH MAINTENANCE LETTER (OUTPATIENT)
Age: 58
End: 2023-06-04

## 2023-06-06 ENCOUNTER — OFFICE VISIT (OUTPATIENT)
Dept: FAMILY MEDICINE | Facility: CLINIC | Age: 58
End: 2023-06-06
Payer: COMMERCIAL

## 2023-06-06 VITALS
HEART RATE: 88 BPM | BODY MASS INDEX: 43.4 KG/M2 | OXYGEN SATURATION: 97 % | WEIGHT: 293 LBS | RESPIRATION RATE: 18 BRPM | DIASTOLIC BLOOD PRESSURE: 86 MMHG | TEMPERATURE: 98.5 F | HEIGHT: 69 IN | SYSTOLIC BLOOD PRESSURE: 132 MMHG

## 2023-06-06 DIAGNOSIS — K76.0 HEPATIC STEATOSIS: ICD-10-CM

## 2023-06-06 DIAGNOSIS — N85.00 ENDOMETRIAL HYPERPLASIA: ICD-10-CM

## 2023-06-06 DIAGNOSIS — Z01.818 PREOP GENERAL PHYSICAL EXAM: Primary | ICD-10-CM

## 2023-06-06 DIAGNOSIS — Z82.69 FAMILY HISTORY OF SYSTEMIC LUPUS ERYTHEMATOSUS: ICD-10-CM

## 2023-06-06 DIAGNOSIS — Z13.220 SCREENING FOR LIPID DISORDERS: ICD-10-CM

## 2023-06-06 PROCEDURE — 99214 OFFICE O/P EST MOD 30 MIN: CPT | Performed by: STUDENT IN AN ORGANIZED HEALTH CARE EDUCATION/TRAINING PROGRAM

## 2023-06-06 ASSESSMENT — PAIN SCALES - GENERAL: PAINLEVEL: NO PAIN (1)

## 2023-06-06 NOTE — PATIENT INSTRUCTIONS
For informational purposes only. Not to replace the advice of your health care provider. Copyright   2003,  [facility]. All rights reserved. Clinically reviewed by Aaliyah Sanchez MD. [employer] [id number] - REV .  Preparing for Your Surgery  Getting started  A nurse will call you to review your health history and instructions. They will give you an arrival time based on your scheduled surgery time. Please be ready to share:  Your doctor's clinic name and phone number  Your medical, surgical, and anesthesia history  A list of allergies and sensitivities  A list of medicines, including herbal treatments and over-the-counter drugs  Whether the patient has a legal guardian (ask how to send us the papers in advance)  Please tell us if you're pregnant--or if there's any chance you might be pregnant. Some surgeries may injure a fetus (unborn baby), so they require a pregnancy test. Surgeries that are safe for a fetus don't always need a test, and you can choose whether to have one.   If you have a child who's having surgery, please ask for a copy of Preparing for Your Child's Surgery.    Preparing for surgery  Within 10 to 30 days of surgery: Have a pre-op exam (sometimes called an H&P, or History and Physical). This can be done at a clinic or pre-operative center.  If you're having a , you may not need this exam. Talk to your care team.  At your pre-op exam, talk to your care team about all medicines you take. If you need to stop any medicines before surgery, ask when to start taking them again.  We do this for your safety. Many medicines can make you bleed too much during surgery. Some change how well surgery (anesthesia) drugs work.  Call your insurance company to let them know you're having surgery. (If you don't have insurance, call [phone].)  Call your clinic if there's any change in your health. This includes signs of a cold or flu (sore throat, runny nose, cough, rash, fever). It  also includes a scrape or scratch near the surgery site.  If you have questions on the day of surgery, call your hospital or surgery center.  Eating and drinking guidelines  For your safety: Unless your surgeon tells you otherwise, follow the guidelines below.  Eat and drink as usual until 8 hours before you arrive for surgery. After that, no food or milk.  Drink clear liquids until 2 hours before you arrive. These are liquids you can see through, like water, Gatorade, and Propel Water. They also include plain black coffee and tea (no cream or milk), candy, and breath mints. You can spit out gum when you arrive.  If you drink alcohol: Stop drinking it the night before surgery.  If your care team tells you to take medicine on the morning of surgery, it's okay to take it with a sip of water.  Preventing infection  Shower or bathe the night before and morning of your surgery. Follow the instructions your clinic gave you. (If no instructions, use regular soap.)  Don't shave or clip hair near your surgery site. We'll remove the hair if needed.  Don't smoke or vape the morning of surgery. You may chew nicotine gum up to 2 hours before surgery. A nicotine patch is okay.  Note: Some surgeries require you to completely quit smoking and nicotine. Check with your surgeon.  Your care team will make every effort to keep you safe from infection. We will:  Clean our hands often with soap and water (or an alcohol-based hand rub).  Clean the skin at your surgery site with a special soap that kills germs.  Give you a special gown to keep you warm. (Cold raises the risk of infection.)  Wear special hair covers, masks, gowns and gloves during surgery.  Give antibiotic medicine, if prescribed. Not all surgeries need antibiotics.  What to bring on the day of surgery  Photo ID and insurance card  Copy of your health care directive, if you have one  Glasses and hearing aids (bring cases)  You can't wear contacts during surgery  Inhaler and  eye drops, if you use them (tell us about these when you arrive)  CPAP machine or breathing device, if you use them  A few personal items, if spending the night  If you have . . .  A pacemaker, ICD (cardiac defibrillator) or other implant: Bring the ID card.  An implanted stimulator: Bring the remote control.  A legal guardian: Bring a copy of the certified (court-stamped) guardianship papers.  Please remove any jewelry, including body piercings. Leave jewelry and other valuables at home.  If you're going home the day of surgery  You must have a responsible adult drive you home. They should stay with you overnight as well.  If you don't have someone to stay with you, and you aren't safe to go home alone, we may keep you overnight. Insurance often won't pay for this.  After surgery  If it's hard to control your pain or you need more pain medicine, please call your surgeon's office.  Questions?   If you have any questions for your care team, list them here: _________________________________________________________________________________________________________________________________________________________________________ ____________________________________ ____________________________________ ____________________________________    How to Take Your Medication Before Surgery  - HOLD (do not take) ibuprofen for 24 hours prior to procedure

## 2023-06-06 NOTE — PROGRESS NOTES
[facility]  [address] [place] [id number]  Phone: [phone]  Fax: [phone]  Primary Provider: Jaclyn Pena  Pre-op Performing Provider: DANNIE HERNÁNDEZ      PREOPERATIVE EVALUATION:  Today's date: 6/6/2023    Alena Ferro is a 58 year old female who presents for a preoperative evaluation.    Surgical Information:  Surgery/Procedure: HYSTERECTOMY, TOTAL, LAPAROSCOPIC with bilateral salpingo-oophorectomy, cystoscopy  Surgery Location: [facility]  Surgeon:   Surgery Date: 06/14/2023  Time of Surgery: 07:30am  Where patient plans to recover: At home with family  Fax number for surgical facility: Note does not need to be faxed, will be available electronically in Epic.    Assessment & Plan     The proposed surgical procedure is considered INTERMEDIATE risk.    Preop general physical exam  Endometrial hyperplasia  Very pleasant 58 year old who presents for preop prior to hysterectomy. Patient had polyp removed during hysteroscopy and did have some hyperplasia. Opted to pursue hysterectomy in the context of ongoing abdominal pain. Has had some slight improvement in pain since polypectomy. No further workup needed today regarding preop. Below workup related to upcoming appointment.     Hepatic steatosis  Noted on CT abdomen in March of this year. Patient planning to return to discuss weight loss and follow up on this.   - Comprehensive metabolic panel  - PRIMARY CARE FOLLOW-UP SCHEDULING    Screening for lipid disorders  Due for lipid screen. Would like to do this fasting and discuss results at upcoming appointment.   - Lipid panel reflex to direct LDL Fasting  - PRIMARY CARE FOLLOW-UP SCHEDULING  - PRIMARY CARE FOLLOW-UP SCHEDULING    Family history of systemic lupus erythematosus  Family history of SLE in mother, just recently diagnosed. Patient with some joint pains in the hands which certainly could be osteoarthritis. However, will obtain below workup  as well to screen for rheum issue for patient.   - Rheumatoid factor  - Anti Nuclear Gavi IgG by IFA with Reflex  - Erythrocyte sedimentation rate auto  - CRP inflammation  - PRIMARY CARE FOLLOW-UP SCHEDULING              - No identified additional risk factors other than previously addressed    Antiplatelet or Anticoagulation Medication Instructions:   - Patient is on no antiplatelet or anticoagulation medications.    Additional Medication Instructions:  Patient is on no additional chronic medications    RECOMMENDATION:  APPROVAL GIVEN to proceed with proposed procedure, without further diagnostic evaluation.    I spent a total of 30 minutes on the day of the visit.   Time spent by me doing chart review, history and exam, documentation and further activities per the note    Subjective       HPI related to upcoming procedure:   Since polyp removal  Less brain fog.   Does have some improvement in pain.         6/6/2023    10:05 AM   Preop Questions   1. Have you ever had a heart attack or stroke? No   2. Have you ever had surgery on your heart or blood vessels, such as a stent placement, a coronary artery bypass, or surgery on an artery in your head, neck, heart, or legs? No   3. Do you have chest pain with activity? No   4. Do you have a history of  heart failure? No   5. Do you currently have a cold, bronchitis or symptoms of other infection? No   6. Do you have a cough, shortness of breath, or wheezing? No   7. Do you or anyone in your family have previous history of blood clots? No   8. Do you or does anyone in your family have a serious bleeding problem such as prolonged bleeding following surgeries or cuts? No   9. Have you ever had problems with anemia or been told to take iron pills? No   10. Have you had any abnormal blood loss such as black, tarry or bloody stools, or abnormal vaginal bleeding? No   11. Have you ever had a blood transfusion? No   12. Are you willing to have a blood transfusion if it is  medically needed before, during, or after your surgery? Yes   13. Have you or any of your relatives ever had problems with anesthesia? No   14. Do you have sleep apnea, excessive snoring or daytime drowsiness? No   15. Do you have any artifical heart valves or other implanted medical devices like a pacemaker, defibrillator, or continuous glucose monitor? No   16. Do you have artificial joints? No   17. Are you allergic to latex? No   18. Is there any chance that you may be pregnant? No       Preoperative Review of :   reviewed - no record of controlled substances prescribed.      Review of Systems  CONSTITUTIONAL: NEGATIVE for fever, chills, change in weight  INTEGUMENTARY/SKIN: NEGATIVE for worrisome rashes, moles or lesions  EYES: NEGATIVE for vision changes or irritation  ENT/MOUTH: NEGATIVE for ear, mouth and throat problems  RESP: NEGATIVE for significant cough or SOB  CV: NEGATIVE for chest pain, palpitations or peripheral edema  GI: Suprapubic and right lower quadrant abdominal pain. NEGATIVE for nausea, other abdominal pain, heartburn, or change in bowel habits  : NEGATIVE for frequency, dysuria, or hematuria  MUSCULOSKELETAL: NEGATIVE for significant arthralgias or myalgia  NEURO: NEGATIVE for weakness, dizziness or paresthesias  ENDOCRINE: NEGATIVE for temperature intolerance, skin/hair changes  HEME: NEGATIVE for bleeding problems  PSYCHIATRIC: NEGATIVE for changes in mood or affect    Patient Active Problem List    Diagnosis Date Noted     Morbid obesity (H) 03/10/2023     Priority: Medium     ADHD (attention deficit hyperactivity disorder), inattentive type 09/21/2016     Priority: Medium     Screening for malignant neoplasm of cervix 09/07/2016     Priority: Medium     Per visit note dated August 31 , 2016: Hx of abnormal paps: no  2009-2010-2013 NILM  2016 NILM, hpv negative 51 y.o.  PLAN: Co-test 8/2021  ; Pap test history         Shellfish allergy 09/18/2013     Priority: Medium      "Intestinal bacterial overgrowth 03/19/2013     Priority: Medium      Past Medical History:   Diagnosis Date     ADHD      Breast cyst      Ear pain     sharp pains     HSV-2 infection      Tinnitus      Past Surgical History:   Procedure Laterality Date     DILATION AND CURETTAGE, HYSTEROSCOPY DIAGNOSTIC, COMBINED N/A 5/8/2023    Procedure: HYSTEROSCOPY, DIAGNOSTIC, WITH DILATION AND CURETTAGE OF UTERUS, endometrial sampling with myosure;  Surgeon: Eli Manzano MD;  Location: [facility] OR     Current Outpatient Medications   Medication Sig Dispense Refill     valACYclovir (VALTREX) 1000 MG tablet Takes prn (Patient not taking: Reported on 5/19/2023)         Allergies   Allergen Reactions     Shellfish Containing Products [Shellfish-Derived Products] Hives     Happens when eating [place] in [place]  No respiratory symptoms to date.      Amoxicillin Hives        Social History     Tobacco Use     Smoking status: Never     Smokeless tobacco: Never   Vaping Use     Vaping status: Never Used   Substance Use Topics     Alcohol use: Not on file     Family History   Problem Relation Age of Onset     Breast Cancer Mother 60.00     No Known Problems Father      History   Drug Use Unknown         Objective     /86 (BP Location: Right arm, Patient Position: Sitting, Cuff Size: Adult Regular)   Pulse 88   Temp 98.5  F (36.9  C) (Tympanic)   Resp 18   Ht 1.753 m (5' 9\")   Wt 138.3 kg (305 lb)   SpO2 97%   BMI 45.04 kg/m      Physical Exam    GENERAL APPEARANCE: healthy, alert and no distress     EYES: EOMI     HENT: ear canals and TM's normal and nose and mouth without ulcers or lesions     NECK: no adenopathy, no asymmetry, masses, or scars     RESP: lungs clear to auscultation - no rales, rhonchi or wheezes     CV: regular rates and rhythm, normal S1 S2, no S3 or S4 and no murmur, click or rub     ABDOMEN:  soft, tender to suprapubic region, no HSM or masses and bowel sounds normal     MS: extremities " normal- no gross deformities noted, no evidence of inflammation in joints, FROM in all extremities.     SKIN: no suspicious lesions or rashes     NEURO: Normal strength and tone, sensory exam grossly normal, mentation intact and speech normal     PSYCH: mentation appears normal. and affect normal/bright     LYMPHATICS: No cervical adenopathy    Recent Labs   Lab Test 05/08/23  1045 03/10/23  1655   HGB 14.4 14.5   PLT  --  218   NA  --  142   POTASSIUM  --  3.7   CR  --  0.96*        Diagnostics:  No labs were ordered during this visit.   No EKG required, no history of coronary heart disease, significant arrhythmia, peripheral arterial disease or other structural heart disease.    Revised Cardiac Risk Index (RCRI):  The patient has the following serious cardiovascular risks for perioperative complications:   - No serious cardiac risks = 0 points     RCRI Interpretation: 0 points: Class I (very low risk - 0.4% complication rate)           Signed Electronically by: Lynn Eid MD  Copy of this evaluation report is provided to requesting physician.

## 2023-06-06 NOTE — H&P (VIEW-ONLY)
[facility]  [address] [place] [id number]  Phone: [phone]  Fax: [phone]  Primary Provider: Jaclyn Pena  Pre-op Performing Provider: DANNIE HERNÁNDEZ      PREOPERATIVE EVALUATION:  Today's date: 6/6/2023    Alena Ferro is a 58 year old female who presents for a preoperative evaluation.    Surgical Information:  Surgery/Procedure: HYSTERECTOMY, TOTAL, LAPAROSCOPIC with bilateral salpingo-oophorectomy, cystoscopy  Surgery Location: [facility]  Surgeon:   Surgery Date: 06/14/2023  Time of Surgery: 07:30am  Where patient plans to recover: At home with family  Fax number for surgical facility: Note does not need to be faxed, will be available electronically in Epic.    Assessment & Plan     The proposed surgical procedure is considered INTERMEDIATE risk.    Preop general physical exam  Endometrial hyperplasia  Very pleasant 58 year old who presents for preop prior to hysterectomy. Patient had polyp removed during hysteroscopy and did have some hyperplasia. Opted to pursue hysterectomy in the context of ongoing abdominal pain. Has had some slight improvement in pain since polypectomy. No further workup needed today regarding preop. Below workup related to upcoming appointment.     Hepatic steatosis  Noted on CT abdomen in March of this year. Patient planning to return to discuss weight loss and follow up on this.   - Comprehensive metabolic panel  - PRIMARY CARE FOLLOW-UP SCHEDULING    Screening for lipid disorders  Due for lipid screen. Would like to do this fasting and discuss results at upcoming appointment.   - Lipid panel reflex to direct LDL Fasting  - PRIMARY CARE FOLLOW-UP SCHEDULING  - PRIMARY CARE FOLLOW-UP SCHEDULING    Family history of systemic lupus erythematosus  Family history of SLE in mother, just recently diagnosed. Patient with some joint pains in the hands which certainly could be osteoarthritis. However, will obtain below workup  as well to screen for rheum issue for patient.   - Rheumatoid factor  - Anti Nuclear Gavi IgG by IFA with Reflex  - Erythrocyte sedimentation rate auto  - CRP inflammation  - PRIMARY CARE FOLLOW-UP SCHEDULING              - No identified additional risk factors other than previously addressed    Antiplatelet or Anticoagulation Medication Instructions:   - Patient is on no antiplatelet or anticoagulation medications.    Additional Medication Instructions:  Patient is on no additional chronic medications    RECOMMENDATION:  APPROVAL GIVEN to proceed with proposed procedure, without further diagnostic evaluation.    I spent a total of 30 minutes on the day of the visit.   Time spent by me doing chart review, history and exam, documentation and further activities per the note    Subjective       HPI related to upcoming procedure:   Since polyp removal  Less brain fog.   Does have some improvement in pain.         6/6/2023    10:05 AM   Preop Questions   1. Have you ever had a heart attack or stroke? No   2. Have you ever had surgery on your heart or blood vessels, such as a stent placement, a coronary artery bypass, or surgery on an artery in your head, neck, heart, or legs? No   3. Do you have chest pain with activity? No   4. Do you have a history of  heart failure? No   5. Do you currently have a cold, bronchitis or symptoms of other infection? No   6. Do you have a cough, shortness of breath, or wheezing? No   7. Do you or anyone in your family have previous history of blood clots? No   8. Do you or does anyone in your family have a serious bleeding problem such as prolonged bleeding following surgeries or cuts? No   9. Have you ever had problems with anemia or been told to take iron pills? No   10. Have you had any abnormal blood loss such as black, tarry or bloody stools, or abnormal vaginal bleeding? No   11. Have you ever had a blood transfusion? No   12. Are you willing to have a blood transfusion if it is  medically needed before, during, or after your surgery? Yes   13. Have you or any of your relatives ever had problems with anesthesia? No   14. Do you have sleep apnea, excessive snoring or daytime drowsiness? No   15. Do you have any artifical heart valves or other implanted medical devices like a pacemaker, defibrillator, or continuous glucose monitor? No   16. Do you have artificial joints? No   17. Are you allergic to latex? No   18. Is there any chance that you may be pregnant? No       Preoperative Review of :   reviewed - no record of controlled substances prescribed.      Review of Systems  CONSTITUTIONAL: NEGATIVE for fever, chills, change in weight  INTEGUMENTARY/SKIN: NEGATIVE for worrisome rashes, moles or lesions  EYES: NEGATIVE for vision changes or irritation  ENT/MOUTH: NEGATIVE for ear, mouth and throat problems  RESP: NEGATIVE for significant cough or SOB  CV: NEGATIVE for chest pain, palpitations or peripheral edema  GI: Suprapubic and right lower quadrant abdominal pain. NEGATIVE for nausea, other abdominal pain, heartburn, or change in bowel habits  : NEGATIVE for frequency, dysuria, or hematuria  MUSCULOSKELETAL: NEGATIVE for significant arthralgias or myalgia  NEURO: NEGATIVE for weakness, dizziness or paresthesias  ENDOCRINE: NEGATIVE for temperature intolerance, skin/hair changes  HEME: NEGATIVE for bleeding problems  PSYCHIATRIC: NEGATIVE for changes in mood or affect    Patient Active Problem List    Diagnosis Date Noted     Morbid obesity (H) 03/10/2023     Priority: Medium     ADHD (attention deficit hyperactivity disorder), inattentive type 09/21/2016     Priority: Medium     Screening for malignant neoplasm of cervix 09/07/2016     Priority: Medium     Per visit note dated August 31 , 2016: Hx of abnormal paps: no  2009-2010-2013 NILM  2016 NILM, hpv negative 51 y.o.  PLAN: Co-test 8/2021  ; Pap test history         Shellfish allergy 09/18/2013     Priority: Medium      "Intestinal bacterial overgrowth 03/19/2013     Priority: Medium      Past Medical History:   Diagnosis Date     ADHD      Breast cyst      Ear pain     sharp pains     HSV-2 infection      Tinnitus      Past Surgical History:   Procedure Laterality Date     DILATION AND CURETTAGE, HYSTEROSCOPY DIAGNOSTIC, COMBINED N/A 5/8/2023    Procedure: HYSTEROSCOPY, DIAGNOSTIC, WITH DILATION AND CURETTAGE OF UTERUS, endometrial sampling with myosure;  Surgeon: Eli Manzano MD;  Location: [facility] OR     Current Outpatient Medications   Medication Sig Dispense Refill     valACYclovir (VALTREX) 1000 MG tablet Takes prn (Patient not taking: Reported on 5/19/2023)         Allergies   Allergen Reactions     Shellfish Containing Products [Shellfish-Derived Products] Hives     Happens when eating [place] in [place]  No respiratory symptoms to date.      Amoxicillin Hives        Social History     Tobacco Use     Smoking status: Never     Smokeless tobacco: Never   Vaping Use     Vaping status: Never Used   Substance Use Topics     Alcohol use: Not on file     Family History   Problem Relation Age of Onset     Breast Cancer Mother 60.00     No Known Problems Father      History   Drug Use Unknown         Objective     /86 (BP Location: Right arm, Patient Position: Sitting, Cuff Size: Adult Regular)   Pulse 88   Temp 98.5  F (36.9  C) (Tympanic)   Resp 18   Ht 1.753 m (5' 9\")   Wt 138.3 kg (305 lb)   SpO2 97%   BMI 45.04 kg/m      Physical Exam    GENERAL APPEARANCE: healthy, alert and no distress     EYES: EOMI     HENT: ear canals and TM's normal and nose and mouth without ulcers or lesions     NECK: no adenopathy, no asymmetry, masses, or scars     RESP: lungs clear to auscultation - no rales, rhonchi or wheezes     CV: regular rates and rhythm, normal S1 S2, no S3 or S4 and no murmur, click or rub     ABDOMEN:  soft, tender to suprapubic region, no HSM or masses and bowel sounds normal     MS: extremities " normal- no gross deformities noted, no evidence of inflammation in joints, FROM in all extremities.     SKIN: no suspicious lesions or rashes     NEURO: Normal strength and tone, sensory exam grossly normal, mentation intact and speech normal     PSYCH: mentation appears normal. and affect normal/bright     LYMPHATICS: No cervical adenopathy    Recent Labs   Lab Test 05/08/23  1045 03/10/23  1655   HGB 14.4 14.5   PLT  --  218   NA  --  142   POTASSIUM  --  3.7   CR  --  0.96*        Diagnostics:  No labs were ordered during this visit.   No EKG required, no history of coronary heart disease, significant arrhythmia, peripheral arterial disease or other structural heart disease.    Revised Cardiac Risk Index (RCRI):  The patient has the following serious cardiovascular risks for perioperative complications:   - No serious cardiac risks = 0 points     RCRI Interpretation: 0 points: Class I (very low risk - 0.4% complication rate)           Signed Electronically by: Lynn Eid MD  Copy of this evaluation report is provided to requesting physician.

## 2023-06-07 ENCOUNTER — ANCILLARY PROCEDURE (OUTPATIENT)
Dept: MAMMOGRAPHY | Facility: CLINIC | Age: 58
End: 2023-06-07
Attending: FAMILY MEDICINE
Payer: COMMERCIAL

## 2023-06-07 DIAGNOSIS — Z12.31 VISIT FOR SCREENING MAMMOGRAM: ICD-10-CM

## 2023-06-07 PROCEDURE — 77067 SCR MAMMO BI INCL CAD: CPT | Mod: TC | Performed by: STUDENT IN AN ORGANIZED HEALTH CARE EDUCATION/TRAINING PROGRAM

## 2023-06-07 PROCEDURE — 77063 BREAST TOMOSYNTHESIS BI: CPT | Mod: TC | Performed by: STUDENT IN AN ORGANIZED HEALTH CARE EDUCATION/TRAINING PROGRAM

## 2023-06-13 ENCOUNTER — ANESTHESIA EVENT (OUTPATIENT)
Dept: SURGERY | Facility: CLINIC | Age: 58
End: 2023-06-13
Payer: COMMERCIAL

## 2023-06-13 ENCOUNTER — HOSPITAL ENCOUNTER (OUTPATIENT)
Dept: ULTRASOUND IMAGING | Facility: CLINIC | Age: 58
Discharge: HOME | End: 2023-06-13
Attending: FAMILY MEDICINE | Admitting: FAMILY MEDICINE
Payer: COMMERCIAL

## 2023-06-13 DIAGNOSIS — R92.8 ABNORMAL MAMMOGRAM: ICD-10-CM

## 2023-06-13 PROCEDURE — 76642 ULTRASOUND BREAST LIMITED: CPT | Mod: LT

## 2023-06-13 NOTE — ANESTHESIA PREPROCEDURE EVALUATION
Anesthesia Pre-Procedure Evaluation    Patient: Alena Ferro   MRN: [id number] : 1965        Procedure : Procedure(s):  HYSTEROSCOPY, DIAGNOSTIC, WITH DILATION AND CURETTAGE OF UTERUS, endometrial sampling with myosure          Past Medical History:   Diagnosis Date     ADHD      Breast cyst      Ear pain     sharp pains     HSV-2 infection      Tinnitus       Past Surgical History:   Procedure Laterality Date     DILATION AND CURETTAGE, HYSTEROSCOPY DIAGNOSTIC, COMBINED N/A 2023    Procedure: HYSTEROSCOPY, DIAGNOSTIC, WITH DILATION AND CURETTAGE OF UTERUS, endometrial sampling with myosure;  Surgeon: Eli Manzano MD;  Location: [facility] OR      Allergies   Allergen Reactions     Shellfish Containing Products [Shellfish-Derived Products] Hives     Happens when eating [place] in [place]  No respiratory symptoms to date.      Amoxicillin Hives      Social History     Tobacco Use     Smoking status: Never     Smokeless tobacco: Never   Vaping Use     Vaping status: Never Used   Substance Use Topics     Alcohol use: Not on file      Wt Readings from Last 1 Encounters:   23 138.3 kg (305 lb)        Anesthesia Evaluation   Pt has had prior anesthetic. Type: General and MAC.    History of anesthetic complications  - motion sickness.      ROS/MED HX  ENT/Pulmonary:     (+) GUDELIA risk factors, obese,     Neurologic:  - neg neurologic ROS     Cardiovascular:  - neg cardiovascular ROS     METS/Exercise Tolerance: >4 METS    Hematologic:  - neg hematologic  ROS     Musculoskeletal:  - neg musculoskeletal ROS     GI/Hepatic:  - neg GI/hepatic ROS     Renal/Genitourinary:  - neg Renal ROS     Endo: Comment: Morbid ob    (+) Obesity,     Psychiatric/Substance Use:     (+) psychiatric history other (comment)     Infectious Disease:       Malignancy:  - neg malignancy ROS     Other:  - neg other ROS          Physical Exam    Airway        Mallampati: I   TM distance: > 3 FB   Neck ROM: full   Mouth  opening: > 3 cm    Respiratory Devices and Support         Dental    unable to assess        Cardiovascular   cardiovascular exam normal          Pulmonary   pulmonary exam normal                OUTSIDE LABS:  CBC:   Lab Results   Component Value Date    WBC 6.1 03/10/2023    HGB 14.4 05/08/2023    HGB 14.5 03/10/2023    HCT 43.5 03/10/2023     03/10/2023     BMP:   Lab Results   Component Value Date     03/10/2023    POTASSIUM 3.7 03/10/2023    CHLORIDE 107 03/10/2023    CO2 24 03/10/2023    BUN 12.7 03/10/2023    CR 0.96 (H) 03/10/2023     (H) 03/10/2023     COAGS: No results found for: PTT, INR, FIBR  POC: No results found for: BGM, HCG, HCGS  HEPATIC: No results found for: ALBUMIN, PROTTOTAL, ALT, AST, GGT, ALKPHOS, BILITOTAL, BILIDIRECT, COURT  OTHER:   Lab Results   Component Value Date    PLACIDO 9.4 03/10/2023       Anesthesia Plan    ASA Status:  3   NPO Status:  NPO Appropriate    Anesthesia Type: General.     - Airway: ETT   Induction: Propofol, Intravenous.   Maintenance: TIVA.        Consents    Anesthesia Plan(s) and associated risks, benefits, and realistic alternatives discussed. Questions answered and patient/representative(s) expressed understanding.    - Discussed:     - Discussed with:  Patient      - Extended Intubation/Ventilatory Support Discussed: No.      - Patient is DNR/DNI Status: No    Use of blood products discussed: No .     Postoperative Care    Pain management: IV analgesics, Oral pain medications, Multi-modal analgesia.   PONV prophylaxis: Ondansetron (or other 5HT-3), Dexamethasone or Solumedrol, Background Propofol Infusion     Comments:                    UDAY Castillo CRNA

## 2023-06-14 ENCOUNTER — HOSPITAL ENCOUNTER (OUTPATIENT)
Facility: CLINIC | Age: 58
Discharge: HOME | End: 2023-06-14
Attending: OBSTETRICS & GYNECOLOGY | Admitting: OBSTETRICS & GYNECOLOGY
Payer: COMMERCIAL

## 2023-06-14 ENCOUNTER — TELEPHONE (OUTPATIENT)
Dept: OBGYN | Facility: CLINIC | Age: 58
End: 2023-06-14

## 2023-06-14 ENCOUNTER — ANESTHESIA (OUTPATIENT)
Dept: SURGERY | Facility: CLINIC | Age: 58
End: 2023-06-14
Payer: COMMERCIAL

## 2023-06-14 VITALS
HEART RATE: 86 BPM | TEMPERATURE: 97.4 F | BODY MASS INDEX: 43.4 KG/M2 | HEIGHT: 69 IN | RESPIRATION RATE: 12 BRPM | WEIGHT: 293 LBS | OXYGEN SATURATION: 92 % | SYSTOLIC BLOOD PRESSURE: 182 MMHG | DIASTOLIC BLOOD PRESSURE: 106 MMHG

## 2023-06-14 DIAGNOSIS — Z90.710 S/P HYSTERECTOMY: Primary | ICD-10-CM

## 2023-06-14 LAB
ABO/RH(D): NORMAL
ANTIBODY SCREEN: NEGATIVE
BASOPHILS # BLD AUTO: 0 10E3/UL (ref 0–0.2)
BASOPHILS NFR BLD AUTO: 0 %
EOSINOPHIL # BLD AUTO: 0.1 10E3/UL (ref 0–0.7)
EOSINOPHIL NFR BLD AUTO: 3 %
ERYTHROCYTE [DISTWIDTH] IN BLOOD BY AUTOMATED COUNT: 13 % (ref 10–15)
HCT VFR BLD AUTO: 41.6 % (ref 35–47)
HGB BLD-MCNC: 13.7 G/DL (ref 11.7–15.7)
IMM GRANULOCYTES # BLD: 0 10E3/UL
IMM GRANULOCYTES NFR BLD: 0 %
LYMPHOCYTES # BLD AUTO: 1.8 10E3/UL (ref 0.8–5.3)
LYMPHOCYTES NFR BLD AUTO: 33 %
MCH RBC QN AUTO: 30.4 PG (ref 26.5–33)
MCHC RBC AUTO-ENTMCNC: 32.9 G/DL (ref 31.5–36.5)
MCV RBC AUTO: 92 FL (ref 78–100)
MONOCYTES # BLD AUTO: 0.6 10E3/UL (ref 0–1.3)
MONOCYTES NFR BLD AUTO: 11 %
NEUTROPHILS # BLD AUTO: 2.8 10E3/UL (ref 1.6–8.3)
NEUTROPHILS NFR BLD AUTO: 53 %
NRBC # BLD AUTO: 0 10E3/UL
NRBC BLD AUTO-RTO: 0 /100
PLATELET # BLD AUTO: 214 10E3/UL (ref 150–450)
RBC # BLD AUTO: 4.51 10E6/UL (ref 3.8–5.2)
SPECIMEN EXPIRATION DATE: NORMAL
WBC # BLD AUTO: 5.4 10E3/UL (ref 4–11)

## 2023-06-14 PROCEDURE — 258N000001 HC RX 258: Performed by: OBSTETRICS & GYNECOLOGY

## 2023-06-14 PROCEDURE — 58571 TLH W/T/O 250 G OR LESS: CPT | Performed by: OBSTETRICS & GYNECOLOGY

## 2023-06-14 PROCEDURE — 258N000003 HC RX IP 258 OP 636: Performed by: NURSE ANESTHETIST, CERTIFIED REGISTERED

## 2023-06-14 PROCEDURE — 250N000013 HC RX MED GY IP 250 OP 250 PS 637: Performed by: OBSTETRICS & GYNECOLOGY

## 2023-06-14 PROCEDURE — 999N000141 HC STATISTIC PRE-PROCEDURE NURSING ASSESSMENT: Performed by: OBSTETRICS & GYNECOLOGY

## 2023-06-14 PROCEDURE — 250N000009 HC RX 250: Performed by: NURSE ANESTHETIST, CERTIFIED REGISTERED

## 2023-06-14 PROCEDURE — 250N000011 HC RX IP 250 OP 636: Performed by: OBSTETRICS & GYNECOLOGY

## 2023-06-14 PROCEDURE — 250N000013 HC RX MED GY IP 250 OP 250 PS 637: Performed by: NURSE ANESTHETIST, CERTIFIED REGISTERED

## 2023-06-14 PROCEDURE — 88307 TISSUE EXAM BY PATHOLOGIST: CPT | Mod: TC | Performed by: OBSTETRICS & GYNECOLOGY

## 2023-06-14 PROCEDURE — 85025 COMPLETE CBC W/AUTO DIFF WBC: CPT | Performed by: OBSTETRICS & GYNECOLOGY

## 2023-06-14 PROCEDURE — 710N000012 HC RECOVERY PHASE 2, PER MINUTE: Performed by: OBSTETRICS & GYNECOLOGY

## 2023-06-14 PROCEDURE — 250N000011 HC RX IP 250 OP 636: Performed by: NURSE ANESTHETIST, CERTIFIED REGISTERED

## 2023-06-14 PROCEDURE — 58571 TLH W/T/O 250 G OR LESS: CPT | Mod: 80 | Performed by: OBSTETRICS & GYNECOLOGY

## 2023-06-14 PROCEDURE — 86901 BLOOD TYPING SEROLOGIC RH(D): CPT | Performed by: OBSTETRICS & GYNECOLOGY

## 2023-06-14 PROCEDURE — 88307 TISSUE EXAM BY PATHOLOGIST: CPT | Mod: 26 | Performed by: PATHOLOGY

## 2023-06-14 PROCEDURE — 710N000009 HC RECOVERY PHASE 1, LEVEL 1, PER MIN: Performed by: OBSTETRICS & GYNECOLOGY

## 2023-06-14 PROCEDURE — 272N000001 HC OR GENERAL SUPPLY STERILE: Performed by: OBSTETRICS & GYNECOLOGY

## 2023-06-14 PROCEDURE — 258N000003 HC RX IP 258 OP 636: Performed by: OBSTETRICS & GYNECOLOGY

## 2023-06-14 PROCEDURE — 370N000017 HC ANESTHESIA TECHNICAL FEE, PER MIN: Performed by: OBSTETRICS & GYNECOLOGY

## 2023-06-14 PROCEDURE — 271N000001 HC OR GENERAL SUPPLY NON-STERILE: Performed by: OBSTETRICS & GYNECOLOGY

## 2023-06-14 PROCEDURE — 36415 COLL VENOUS BLD VENIPUNCTURE: CPT | Performed by: OBSTETRICS & GYNECOLOGY

## 2023-06-14 PROCEDURE — 360N000077 HC SURGERY LEVEL 4, PER MIN: Performed by: OBSTETRICS & GYNECOLOGY

## 2023-06-14 RX ORDER — HYDROMORPHONE HCL IN WATER/PF 6 MG/30 ML
0.2 PATIENT CONTROLLED ANALGESIA SYRINGE INTRAVENOUS EVERY 5 MIN PRN
Status: DISCONTINUED | OUTPATIENT
Start: 2023-06-14 | End: 2023-06-14 | Stop reason: HOSPADM

## 2023-06-14 RX ORDER — OXYCODONE HYDROCHLORIDE 5 MG/1
5-10 TABLET ORAL EVERY 4 HOURS PRN
Qty: 10 TABLET | Refills: 0 | Status: SHIPPED | OUTPATIENT
Start: 2023-06-14 | End: 2023-06-30

## 2023-06-14 RX ORDER — SODIUM CHLORIDE, SODIUM LACTATE, POTASSIUM CHLORIDE, CALCIUM CHLORIDE 600; 310; 30; 20 MG/100ML; MG/100ML; MG/100ML; MG/100ML
INJECTION, SOLUTION INTRAVENOUS CONTINUOUS
Status: DISCONTINUED | OUTPATIENT
Start: 2023-06-14 | End: 2023-06-14 | Stop reason: HOSPADM

## 2023-06-14 RX ORDER — GABAPENTIN 300 MG/1
300 CAPSULE ORAL
Status: COMPLETED | OUTPATIENT
Start: 2023-06-14 | End: 2023-06-14

## 2023-06-14 RX ORDER — METOPROLOL TARTRATE 1 MG/ML
1-2 INJECTION, SOLUTION INTRAVENOUS EVERY 5 MIN PRN
Status: DISCONTINUED | OUTPATIENT
Start: 2023-06-14 | End: 2023-06-14 | Stop reason: HOSPADM

## 2023-06-14 RX ORDER — FENTANYL CITRATE 50 UG/ML
25 INJECTION, SOLUTION INTRAMUSCULAR; INTRAVENOUS EVERY 5 MIN PRN
Status: DISCONTINUED | OUTPATIENT
Start: 2023-06-14 | End: 2023-06-14 | Stop reason: HOSPADM

## 2023-06-14 RX ORDER — IBUPROFEN 800 MG/1
800 TABLET, FILM COATED ORAL EVERY 6 HOURS PRN
Qty: 30 TABLET | Refills: 0 | Status: SHIPPED | OUTPATIENT
Start: 2023-06-14 | End: 2023-06-30

## 2023-06-14 RX ORDER — LIDOCAINE HYDROCHLORIDE 20 MG/ML
INJECTION, SOLUTION INFILTRATION; PERINEURAL PRN
Status: DISCONTINUED | OUTPATIENT
Start: 2023-06-14 | End: 2023-06-14

## 2023-06-14 RX ORDER — BUPIVACAINE HYDROCHLORIDE 2.5 MG/ML
INJECTION, SOLUTION INFILTRATION; PERINEURAL PRN
Status: DISCONTINUED | OUTPATIENT
Start: 2023-06-14 | End: 2023-06-14 | Stop reason: HOSPADM

## 2023-06-14 RX ORDER — HYDRALAZINE HYDROCHLORIDE 20 MG/ML
INJECTION INTRAMUSCULAR; INTRAVENOUS PRN
Status: DISCONTINUED | OUTPATIENT
Start: 2023-06-14 | End: 2023-06-14

## 2023-06-14 RX ORDER — HYDROMORPHONE HCL IN WATER/PF 6 MG/30 ML
0.4 PATIENT CONTROLLED ANALGESIA SYRINGE INTRAVENOUS EVERY 5 MIN PRN
Status: DISCONTINUED | OUTPATIENT
Start: 2023-06-14 | End: 2023-06-14 | Stop reason: HOSPADM

## 2023-06-14 RX ORDER — HYDROXYZINE HYDROCHLORIDE 25 MG/1
25 TABLET, FILM COATED ORAL EVERY 6 HOURS PRN
Status: DISCONTINUED | OUTPATIENT
Start: 2023-06-14 | End: 2023-06-14 | Stop reason: HOSPADM

## 2023-06-14 RX ORDER — ALBUTEROL SULFATE 0.83 MG/ML
2.5 SOLUTION RESPIRATORY (INHALATION) EVERY 4 HOURS PRN
Status: DISCONTINUED | OUTPATIENT
Start: 2023-06-14 | End: 2023-06-14 | Stop reason: HOSPADM

## 2023-06-14 RX ORDER — ONDANSETRON 2 MG/ML
4 INJECTION INTRAMUSCULAR; INTRAVENOUS EVERY 30 MIN PRN
Status: DISCONTINUED | OUTPATIENT
Start: 2023-06-14 | End: 2023-06-14 | Stop reason: HOSPADM

## 2023-06-14 RX ORDER — DEXAMETHASONE SODIUM PHOSPHATE 4 MG/ML
INJECTION, SOLUTION INTRA-ARTICULAR; INTRALESIONAL; INTRAMUSCULAR; INTRAVENOUS; SOFT TISSUE PRN
Status: DISCONTINUED | OUTPATIENT
Start: 2023-06-14 | End: 2023-06-14

## 2023-06-14 RX ORDER — PHENAZOPYRIDINE HYDROCHLORIDE 200 MG/1
200 TABLET, FILM COATED ORAL ONCE
Status: COMPLETED | OUTPATIENT
Start: 2023-06-14 | End: 2023-06-14

## 2023-06-14 RX ORDER — PROPOFOL 10 MG/ML
INJECTION, EMULSION INTRAVENOUS CONTINUOUS PRN
Status: DISCONTINUED | OUTPATIENT
Start: 2023-06-14 | End: 2023-06-14

## 2023-06-14 RX ORDER — MAGNESIUM SULFATE HEPTAHYDRATE 40 MG/ML
2 INJECTION, SOLUTION INTRAVENOUS ONCE
Status: COMPLETED | OUTPATIENT
Start: 2023-06-14 | End: 2023-06-14

## 2023-06-14 RX ORDER — OXYCODONE HYDROCHLORIDE 5 MG/1
5 TABLET ORAL
Status: DISCONTINUED | OUTPATIENT
Start: 2023-06-14 | End: 2023-06-14 | Stop reason: HOSPADM

## 2023-06-14 RX ORDER — ACETAMINOPHEN 325 MG/1
975 TABLET ORAL EVERY 6 HOURS PRN
Qty: 50 TABLET | Refills: 0 | Status: SHIPPED | OUTPATIENT
Start: 2023-06-14 | End: 2023-07-27

## 2023-06-14 RX ORDER — FENTANYL CITRATE 50 UG/ML
INJECTION, SOLUTION INTRAMUSCULAR; INTRAVENOUS PRN
Status: DISCONTINUED | OUTPATIENT
Start: 2023-06-14 | End: 2023-06-14

## 2023-06-14 RX ORDER — KETAMINE HYDROCHLORIDE 10 MG/ML
INJECTION INTRAMUSCULAR; INTRAVENOUS PRN
Status: DISCONTINUED | OUTPATIENT
Start: 2023-06-14 | End: 2023-06-14

## 2023-06-14 RX ORDER — LIDOCAINE 40 MG/G
CREAM TOPICAL
Status: DISCONTINUED | OUTPATIENT
Start: 2023-06-14 | End: 2023-06-14 | Stop reason: HOSPADM

## 2023-06-14 RX ORDER — ONDANSETRON 2 MG/ML
INJECTION INTRAMUSCULAR; INTRAVENOUS PRN
Status: DISCONTINUED | OUTPATIENT
Start: 2023-06-14 | End: 2023-06-14

## 2023-06-14 RX ORDER — ACETAMINOPHEN 325 MG/1
975 TABLET ORAL ONCE
Status: DISCONTINUED | OUTPATIENT
Start: 2023-06-14 | End: 2023-06-14 | Stop reason: HOSPADM

## 2023-06-14 RX ORDER — CLINDAMYCIN PHOSPHATE 900 MG/50ML
900 INJECTION, SOLUTION INTRAVENOUS
Status: COMPLETED | OUTPATIENT
Start: 2023-06-14 | End: 2023-06-14

## 2023-06-14 RX ORDER — PROPOFOL 10 MG/ML
INJECTION, EMULSION INTRAVENOUS PRN
Status: DISCONTINUED | OUTPATIENT
Start: 2023-06-14 | End: 2023-06-14

## 2023-06-14 RX ORDER — IBUPROFEN 200 MG
800 TABLET ORAL ONCE
Status: DISCONTINUED | OUTPATIENT
Start: 2023-06-14 | End: 2023-06-14 | Stop reason: HOSPADM

## 2023-06-14 RX ORDER — ONDANSETRON 4 MG/1
4 TABLET, ORALLY DISINTEGRATING ORAL EVERY 30 MIN PRN
Status: DISCONTINUED | OUTPATIENT
Start: 2023-06-14 | End: 2023-06-14 | Stop reason: HOSPADM

## 2023-06-14 RX ORDER — KETOROLAC TROMETHAMINE 15 MG/ML
15 INJECTION, SOLUTION INTRAMUSCULAR; INTRAVENOUS
Status: COMPLETED | OUTPATIENT
Start: 2023-06-14 | End: 2023-06-14

## 2023-06-14 RX ORDER — DIMENHYDRINATE 50 MG/ML
25 INJECTION, SOLUTION INTRAMUSCULAR; INTRAVENOUS
Status: DISCONTINUED | OUTPATIENT
Start: 2023-06-14 | End: 2023-06-14 | Stop reason: HOSPADM

## 2023-06-14 RX ORDER — ACETAMINOPHEN 325 MG/1
975 TABLET ORAL ONCE
Status: COMPLETED | OUTPATIENT
Start: 2023-06-14 | End: 2023-06-14

## 2023-06-14 RX ORDER — METOPROLOL TARTRATE 1 MG/ML
INJECTION, SOLUTION INTRAVENOUS PRN
Status: DISCONTINUED | OUTPATIENT
Start: 2023-06-14 | End: 2023-06-14

## 2023-06-14 RX ORDER — ACETAMINOPHEN 325 MG/1
975 TABLET ORAL ONCE
Status: DISCONTINUED | OUTPATIENT
Start: 2023-06-14 | End: 2023-06-14

## 2023-06-14 RX ORDER — FENTANYL CITRATE 50 UG/ML
50 INJECTION, SOLUTION INTRAMUSCULAR; INTRAVENOUS EVERY 5 MIN PRN
Status: DISCONTINUED | OUTPATIENT
Start: 2023-06-14 | End: 2023-06-14 | Stop reason: HOSPADM

## 2023-06-14 RX ORDER — CLINDAMYCIN PHOSPHATE 900 MG/50ML
900 INJECTION, SOLUTION INTRAVENOUS SEE ADMIN INSTRUCTIONS
Status: DISCONTINUED | OUTPATIENT
Start: 2023-06-14 | End: 2023-06-14 | Stop reason: HOSPADM

## 2023-06-14 RX ORDER — OXYCODONE HYDROCHLORIDE 5 MG/1
10 TABLET ORAL
Status: DISCONTINUED | OUTPATIENT
Start: 2023-06-14 | End: 2023-06-14 | Stop reason: HOSPADM

## 2023-06-14 RX ADMIN — GENTAMICIN SULFATE 190 MG: 40 INJECTION, SOLUTION INTRAMUSCULAR; INTRAVENOUS at 08:00

## 2023-06-14 RX ADMIN — FENTANYL CITRATE 25 MCG: 50 INJECTION, SOLUTION INTRAMUSCULAR; INTRAVENOUS at 10:17

## 2023-06-14 RX ADMIN — FENTANYL CITRATE 100 MCG: 50 INJECTION, SOLUTION INTRAMUSCULAR; INTRAVENOUS at 08:16

## 2023-06-14 RX ADMIN — KETOROLAC TROMETHAMINE 15 MG: 15 INJECTION, SOLUTION INTRAMUSCULAR; INTRAVENOUS at 09:56

## 2023-06-14 RX ADMIN — METOPROLOL TARTRATE 2.5 MG: 5 INJECTION INTRAVENOUS at 08:38

## 2023-06-14 RX ADMIN — KETAMINE HYDROCHLORIDE 20 MG: 10 INJECTION, SOLUTION INTRAMUSCULAR; INTRAVENOUS at 08:00

## 2023-06-14 RX ADMIN — METOPROLOL TARTRATE 1 MG: 5 INJECTION INTRAVENOUS at 11:01

## 2023-06-14 RX ADMIN — CLINDAMYCIN PHOSPHATE 900 MG: 900 INJECTION, SOLUTION INTRAVENOUS at 07:43

## 2023-06-14 RX ADMIN — SUGAMMADEX 200 MG: 100 INJECTION, SOLUTION INTRAVENOUS at 09:24

## 2023-06-14 RX ADMIN — LIDOCAINE HYDROCHLORIDE 100 MG: 20 INJECTION, SOLUTION INFILTRATION; PERINEURAL at 07:52

## 2023-06-14 RX ADMIN — GABAPENTIN 300 MG: 300 CAPSULE ORAL at 06:26

## 2023-06-14 RX ADMIN — KETAMINE HYDROCHLORIDE 20 MG: 10 INJECTION, SOLUTION INTRAMUSCULAR; INTRAVENOUS at 08:35

## 2023-06-14 RX ADMIN — PROPOFOL 250 MCG/KG/MIN: 10 INJECTION, EMULSION INTRAVENOUS at 07:52

## 2023-06-14 RX ADMIN — MIDAZOLAM 2 MG: 1 INJECTION INTRAMUSCULAR; INTRAVENOUS at 07:43

## 2023-06-14 RX ADMIN — KETAMINE HYDROCHLORIDE 10 MG: 10 INJECTION, SOLUTION INTRAMUSCULAR; INTRAVENOUS at 08:25

## 2023-06-14 RX ADMIN — ACETAMINOPHEN 975 MG: 325 TABLET, FILM COATED ORAL at 06:25

## 2023-06-14 RX ADMIN — FENTANYL CITRATE 100 MCG: 50 INJECTION, SOLUTION INTRAMUSCULAR; INTRAVENOUS at 07:46

## 2023-06-14 RX ADMIN — SODIUM CHLORIDE, POTASSIUM CHLORIDE, SODIUM LACTATE AND CALCIUM CHLORIDE: 600; 310; 30; 20 INJECTION, SOLUTION INTRAVENOUS at 09:28

## 2023-06-14 RX ADMIN — HYDROMORPHONE HYDROCHLORIDE 0.5 MG: 1 INJECTION, SOLUTION INTRAMUSCULAR; INTRAVENOUS; SUBCUTANEOUS at 08:30

## 2023-06-14 RX ADMIN — FENTANYL CITRATE 100 MCG: 50 INJECTION, SOLUTION INTRAMUSCULAR; INTRAVENOUS at 08:43

## 2023-06-14 RX ADMIN — PHENAZOPYRIDINE 200 MG: 200 TABLET ORAL at 06:26

## 2023-06-14 RX ADMIN — DEXAMETHASONE SODIUM PHOSPHATE 4 MG: 4 INJECTION, SOLUTION INTRA-ARTICULAR; INTRALESIONAL; INTRAMUSCULAR; INTRAVENOUS; SOFT TISSUE at 08:20

## 2023-06-14 RX ADMIN — METOPROLOL TARTRATE 2.5 MG: 5 INJECTION INTRAVENOUS at 08:34

## 2023-06-14 RX ADMIN — ROCURONIUM BROMIDE 50 MG: 50 INJECTION, SOLUTION INTRAVENOUS at 07:52

## 2023-06-14 RX ADMIN — HYDROMORPHONE HYDROCHLORIDE 0.5 MG: 1 INJECTION, SOLUTION INTRAMUSCULAR; INTRAVENOUS; SUBCUTANEOUS at 08:25

## 2023-06-14 RX ADMIN — MAGNESIUM SULFATE HEPTAHYDRATE 2 G: 40 INJECTION, SOLUTION INTRAVENOUS at 08:27

## 2023-06-14 RX ADMIN — METOPROLOL TARTRATE 1 MG: 5 INJECTION INTRAVENOUS at 10:51

## 2023-06-14 RX ADMIN — ONDANSETRON 4 MG: 2 INJECTION INTRAMUSCULAR; INTRAVENOUS at 09:21

## 2023-06-14 RX ADMIN — SODIUM CHLORIDE, POTASSIUM CHLORIDE, SODIUM LACTATE AND CALCIUM CHLORIDE: 600; 310; 30; 20 INJECTION, SOLUTION INTRAVENOUS at 06:38

## 2023-06-14 RX ADMIN — PROPOFOL 200 MG: 10 INJECTION, EMULSION INTRAVENOUS at 07:52

## 2023-06-14 RX ADMIN — HYDRALAZINE HYDROCHLORIDE 10 MG: 20 INJECTION INTRAMUSCULAR; INTRAVENOUS at 08:55

## 2023-06-14 ASSESSMENT — ACTIVITIES OF DAILY LIVING (ADL)
ADLS_ACUITY_SCORE: 35

## 2023-06-14 NOTE — ANESTHESIA PROCEDURE NOTES
Airway       Patient location during procedure: OR       Procedure Start/Stop Times: 6/14/2023 7:54 AM  Staff -        CRNA: Daisy Rooney APRN CRNA       Performed By: CRNA  Consent for Airway        Urgency: elective  Indications and Patient Condition       Indications for airway management: adrian-procedural        Final Airway Details       Final airway type: endotracheal airway       Successful airway: ETT - single  Endotracheal Airway Details        Cuffed: yes       Successful intubation technique: video laryngoscopy       VL Blade Size: Almodovar 4       Grade View of Cords: 1       Adjucts: stylet       Position: Right       Measured from: lips       Secured at (cm): 22       Bite block used: Oral Airway    Post intubation assessment        Placement verified by: capnometry, equal breath sounds and chest rise        Number of attempts at approach: 1       Number of other approaches attempted: 0       Secured with: plastic tape       Ease of procedure: easy       Dentition: Intact and Unchanged    Medication(s) Administered   Medication Administration Time: 6/14/2023 7:54 AM

## 2023-06-14 NOTE — DISCHARGE INSTRUCTIONS
Same Day Surgery Discharge Instructions  Special Precautions After Surgery - Adult    It is not unusual to feel lightheaded or faint, up to 24 hours after surgery or while taking pain medication.  If you have these symptoms; sit for a few minutes before standing and have someone assist you when getting up.  You should rest and relax for the next 24 hours and must have someone stay with you for at least 24 hours after your discharge.  DO NOT DRIVE any vehicle or operate mechanical equipment for 24 hours following the end of your surgery.  DO NOT DRIVE while taking narcotic pain medications that have been prescribed by your physician.  If you had a limb operated on, you must be able to use it fully to drive.  DO NOT drink alcoholic beverages for 24 hours following surgery or while taking prescription pain medication.  Drink clear liquids (apple juice, ginger ale, broth, 7-Up, etc.).  Progress to your regular diet as you feel able.  Any questions call your physician and do not make important decisions for 24 hours.    Nausea and Vomiting: Nausea and vomiting can occur any time after receiving anesthesia. If you experience nausea and vomiting we encourage you to move to a clear liquid diet and advance your diet as tolerated. If nausea and vomiting do not improve within 12 hours please call the surgeon or present to the Emergency department.     Break-through Bleeding: If your experience bleeding from your surgical site apply pressure and additional dressing per nurse instruction. For simple problems such as a saturated dressing, you may need to reinforce the dressing with more gauze and tape and put slight pressure on the site. If bleeding does not subside contact the surgeon or present to the Emergency Department.    Post-op Infection: If you develop a fever of 100.4 or greater, have pus like drainage, redness, swelling or severe pain at the surgical site not alleviated with pain medications; please  contact the surgeon or present to the Emergency Department.     Medications:  975 mg Acetaminophen (Tylenol) at 0630 AM:  Next dose: 1230pm.  Ibuprofen (Motrin, Advil):  Next dose: 4:00PM.  Pyridium 200MG at 06:30AM:   Take 5-10MG Oxycodone with food as prescribed as needed for pain.   Follow the instructions on the bottle.  __________________________________________________________________________________________________________________________________  IMPORTANT NUMBERS:    [facility] Main Number:  [phone], [phone]  Pharmacy:  [phone]  Same Day Surgery:  [phone], for general post-op questions call Monday - Thursday until 8:30 p.m., Fridays until 6:00 p.m.  Nurse Advice Line:  [phone]                                                                         [facility]:  [phone] option 1  [facility]:  [phone]     [facility] Clinic:  [phone]   Surgery Specialty Clinic:  [phone]   Home Medical Equipment: [phone]  [facility]:  [phone]

## 2023-06-14 NOTE — OR NURSING
[facility] DISCHARGE NOTE    Patient discharged to home at 12:19 PM via ambulation. Accompanied by mother and staff. Discharge instructions reviewed with Patient and family, opportunity offered to ask questions. Prescriptions sent to patients preferred pharmacy. [place], (Mother) went to  prescriptions before discharge.  All belongings sent with patient.  VSS within parameters- acceptable for discharge.     Reed Rendon RN

## 2023-06-14 NOTE — TELEPHONE ENCOUNTER
Paperwork completed and given to Dr. Manzano to sign.    Camelia Luke   Clinic Station    [facility]  [phone]

## 2023-06-14 NOTE — OP NOTE
[facility] Gynecologic Operative Note   Alena Ferro  [id number]  6/14/2023    Preoperative Diagnosis: endometrial hyperplasia, pelvic pain  Postoperative Diagnosis:      Procedure:   1. Total laparoscopic hysterectomy with bilateral salpingo-oophorectomy   2. Cystoscopy    Surgeon: Eli Manzano MD   Assist: DO Eusebia  This assistance of this surgeon was required due to the need for additional skilled surgical hands to retract and hold instruments due to the nature of the surgical procedure and risk of complications.   Specific reasons: morbid obesity      Anesthesia: GETA  Complications: none      EBL: 50 mL   IVF: see anesthesia  UOP: 100 mL clear urine     Findings: Exam under anesthesia revealed normal external genitalia, vaginal mucosa, cervix. Upon entry of the abdomen with the laparoscope normal uterus, ovaries, fallopian tubes. Physiologic thin scarring of sigmoid to left pelvic side wall. A survey of the upper abdomen showed normal anatomy. Cystoscopy with no evidence of bladder injury, ureters with bilateral ureteral jets.     Specimen: Uterus, cervix, bilateral fallopian tubes and ovaries    Indications: Alena Ferro is a 58 year old P0 who presented with pelvic pain. Endometrial biopsy in the OR revealed endometrial hyperplasia. Options reviewed and patient desired definitive management. All risks (including possibility her pain may not be resolved), benefits and alternatives were discussed and written informed consent was obtained.     Procedure: The patient was taken to the operating room where general anesthesia was induced without difficulty. She was then examined under anesthesia with the above noted findings. She was then prepared and draped in the normal sterile fashion in the dorsal lithotomy position using yellow fin stirrups. Attention was turned to the vagina. A bivalve speculum was placed for visualization of the cervix and the anterior lip of the cervix was  then grasped with a single-tooth tenaculum. The cervix was then serially dilated to 7mm. The deliniator manipulator was then placed without difficulty. The manipulator was then secured in place with the stitch of 0 Vicryl and the balloon insufflated with 20 ml of normal saline.     Attention was then turned to the abdomen where a 5mm infra-umbilical skin incision was made. The veres needle was then introduced into the peritoneal cavity while tenting the abdominal wall. Intraperitoneal placement was confirmed by use of a water-filled syringe and drop in intra-abdominal pressure with insufflation of CO2 gas. The gas was turned on and pneumoperitoneum was achieved using CO2 gas. The trocar and sleeve were then advanced without difficulty into the abdomen where intra-abdominal placement was confirmed with the laparoscope. A second 5mm skin incision was then made in the LLQ and the trocar and sleeve advanced under direct visualization. A third skin incision, 10 mm, was made in the RLQ and the trocar and sleeve advanced under direct visualization.     A survey of the patient's abdomen and pelvis was made with the above noted findings. The very then physiologic scarring of sigmoid was gently taken down on the left with blunt dissection and no significant bleeding noted.  Attention was then turned to the pelvis where the R IP ligament was then divided using the Ligasure device. The Ligasure was then used in stepwise fashion along the mesosalpinx to the level of the cornua. Next it was used to transect the round ligament on the right. The anterior leaf of the broad ligament was then incised along the bladder reflection to the midline.  Attention was then turned to the L IP ligament which was then divided using the Ligasure device. The Ligasure was then used in stepwise fashion along the L mesosalpinx to the level of the cornua. Next it was used to transect the round ligament on the left. From this side, the anterior leaf of  the broad ligament was incised along the bladder reflection to the midline. A bladder flap was mobilized and the peritoneum on the vesicouterine fold was incised to mobilize the bladder. The uterine arteries were then transected and ligated using the Ligasure device, down to the level of the colpotomy ring. The vagina was transected along the Koh cup using the monopolar hook resulting in separation of the uterus and cervix. Bilateral tube and ovaries were transected just prior to colpotomy and brought out through the 10 mm port. The uterus, cervix were then delivered through the vagina, and the pneumo-occluder balloon was reinserted to maintain pneumoperitoneum. The vaginal vault was closed with interrupted figure-of-eight stitches of 0-Vicryl using the Endostitch device. The abdomen was irrigated, and excellent hemostasis was noted.     CYSTO: A cystoscopy was performed that showed bilateral ureteral jets, confirming ureteral patency and no evidence of bladder injury.     A final look at the surgical sites showed excellent hemostasis and the 10mm port was closed using the [place]. Then, the abdomen was the desufflated and all trocars were then removed. The skin incisions were then closed using 4-0 monocryl in a subcuticular fashion. All instruments were removed from the vagina and the jansen catheter was removed.    The patient tolerated the procedure well. Gent/clinda was given during the procedure. Sponge, lap and needle counts were correct. The patient was taken to the recovery area in stable condition.    Eli Manzano MD 9:44 AM 6/14/2023

## 2023-06-14 NOTE — ANESTHESIA POSTPROCEDURE EVALUATION
Patient: Alena Ferro    Procedure: Procedure(s):  HYSTERECTOMY, TOTAL, LAPAROSCOPIC WITH BILATERAL SALPINGO_OOPHERECTOMY & CYSTOSCOPY  Cystoscopy       Anesthesia Type:  General    Note:  Disposition: Outpatient   Postop Pain Control: Uneventful            Sign Out: Well controlled pain   PONV: No   Neuro/Psych: Uneventful            Sign Out: Acceptable/Baseline neuro status   Airway/Respiratory: Uneventful            Sign Out: Acceptable/Baseline resp. status   CV/Hemodynamics: Uneventful            Sign Out: Acceptable CV status; No obvious hypovolemia; No obvious fluid overload   Other NRE: NONE   DID A NON-ROUTINE EVENT OCCUR? No           Last vitals:  Vitals Value Taken Time   /110 06/14/23 1107   Temp 36.6  C (97.8  F) 06/14/23 0945   Pulse 85 06/14/23 1113   Resp 12 06/14/23 1113   SpO2 96 % 06/14/23 1113   Vitals shown include unvalidated device data.    Electronically Signed By: UDAY Escalante CRNA  June 14, 2023  1:32 PM

## 2023-06-16 LAB
PATH REPORT.COMMENTS IMP SPEC: NORMAL
PATH REPORT.FINAL DX SPEC: NORMAL
PATH REPORT.GROSS SPEC: NORMAL
PATH REPORT.MICROSCOPIC SPEC OTHER STN: NORMAL
PATH REPORT.RELEVANT HX SPEC: NORMAL
PHOTO IMAGE: NORMAL

## 2023-06-16 NOTE — TELEPHONE ENCOUNTER
Completed paperwork was mailed as requested.  Will keep a copy up front for a few weeks and then send it to be scanned into chart.    -Rach DELEON [facility]  Clinic Station

## 2023-06-28 ENCOUNTER — LAB (OUTPATIENT)
Dept: LAB | Facility: CLINIC | Age: 58
End: 2023-06-28
Payer: COMMERCIAL

## 2023-06-28 DIAGNOSIS — Z82.69 FAMILY HISTORY OF SYSTEMIC LUPUS ERYTHEMATOSUS: ICD-10-CM

## 2023-06-28 DIAGNOSIS — E66.01 CLASS 3 SEVERE OBESITY WITHOUT SERIOUS COMORBIDITY WITH BODY MASS INDEX (BMI) OF 40.0 TO 44.9 IN ADULT, UNSPECIFIED OBESITY TYPE (H): ICD-10-CM

## 2023-06-28 DIAGNOSIS — E66.813 CLASS 3 SEVERE OBESITY WITHOUT SERIOUS COMORBIDITY WITH BODY MASS INDEX (BMI) OF 40.0 TO 44.9 IN ADULT, UNSPECIFIED OBESITY TYPE (H): ICD-10-CM

## 2023-06-28 DIAGNOSIS — Z13.220 SCREENING FOR LIPID DISORDERS: ICD-10-CM

## 2023-06-28 DIAGNOSIS — K76.0 HEPATIC STEATOSIS: ICD-10-CM

## 2023-06-28 LAB
ALBUMIN SERPL BCG-MCNC: 4.3 G/DL (ref 3.5–5.2)
ALP SERPL-CCNC: 78 U/L (ref 35–104)
ALT SERPL W P-5'-P-CCNC: 32 U/L (ref 0–50)
ANION GAP SERPL CALCULATED.3IONS-SCNC: 12 MMOL/L (ref 7–15)
AST SERPL W P-5'-P-CCNC: 28 U/L (ref 0–45)
BILIRUB SERPL-MCNC: 0.4 MG/DL
BUN SERPL-MCNC: 14.1 MG/DL (ref 6–20)
CALCIUM SERPL-MCNC: 9.6 MG/DL (ref 8.6–10)
CHLORIDE SERPL-SCNC: 107 MMOL/L (ref 98–107)
CHOLEST SERPL-MCNC: 181 MG/DL
CREAT SERPL-MCNC: 0.84 MG/DL (ref 0.51–0.95)
CRP SERPL-MCNC: 7.85 MG/L
DEPRECATED HCO3 PLAS-SCNC: 22 MMOL/L (ref 22–29)
ERYTHROCYTE [SEDIMENTATION RATE] IN BLOOD BY WESTERGREN METHOD: 23 MM/HR (ref 0–30)
GFR SERPL CREATININE-BSD FRML MDRD: 80 ML/MIN/1.73M2
GLUCOSE SERPL-MCNC: 122 MG/DL (ref 70–99)
HDLC SERPL-MCNC: 55 MG/DL
LDLC SERPL CALC-MCNC: 92 MG/DL
NONHDLC SERPL-MCNC: 126 MG/DL
POTASSIUM SERPL-SCNC: 4 MMOL/L (ref 3.4–5.3)
PROT SERPL-MCNC: 7.6 G/DL (ref 6.4–8.3)
SODIUM SERPL-SCNC: 141 MMOL/L (ref 136–145)
TRIGL SERPL-MCNC: 169 MG/DL

## 2023-06-28 PROCEDURE — 36415 COLL VENOUS BLD VENIPUNCTURE: CPT

## 2023-06-28 PROCEDURE — 86140 C-REACTIVE PROTEIN: CPT

## 2023-06-28 PROCEDURE — 80061 LIPID PANEL: CPT

## 2023-06-28 PROCEDURE — 85652 RBC SED RATE AUTOMATED: CPT

## 2023-06-28 PROCEDURE — 86038 ANTINUCLEAR ANTIBODIES: CPT

## 2023-06-28 PROCEDURE — 80053 COMPREHEN METABOLIC PANEL: CPT

## 2023-06-28 PROCEDURE — 84443 ASSAY THYROID STIM HORMONE: CPT

## 2023-06-28 PROCEDURE — 86431 RHEUMATOID FACTOR QUANT: CPT

## 2023-06-29 LAB
ANA SER QL IF: NEGATIVE
RHEUMATOID FACT SER NEPH-ACNC: 7 IU/ML

## 2023-06-30 ENCOUNTER — OFFICE VISIT (OUTPATIENT)
Dept: FAMILY MEDICINE | Facility: CLINIC | Age: 58
End: 2023-06-30
Payer: COMMERCIAL

## 2023-06-30 ENCOUNTER — TELEPHONE (OUTPATIENT)
Dept: FAMILY MEDICINE | Facility: CLINIC | Age: 58
End: 2023-06-30

## 2023-06-30 VITALS
TEMPERATURE: 98 F | OXYGEN SATURATION: 97 % | RESPIRATION RATE: 16 BRPM | BODY MASS INDEX: 41.95 KG/M2 | WEIGHT: 293 LBS | DIASTOLIC BLOOD PRESSURE: 84 MMHG | HEIGHT: 70 IN | SYSTOLIC BLOOD PRESSURE: 130 MMHG | HEART RATE: 70 BPM

## 2023-06-30 DIAGNOSIS — R73.03 PREDIABETES: ICD-10-CM

## 2023-06-30 DIAGNOSIS — E66.01 CLASS 3 SEVERE OBESITY WITHOUT SERIOUS COMORBIDITY WITH BODY MASS INDEX (BMI) OF 40.0 TO 44.9 IN ADULT, UNSPECIFIED OBESITY TYPE (H): ICD-10-CM

## 2023-06-30 DIAGNOSIS — E66.813 CLASS 3 SEVERE OBESITY WITHOUT SERIOUS COMORBIDITY WITH BODY MASS INDEX (BMI) OF 40.0 TO 44.9 IN ADULT, UNSPECIFIED OBESITY TYPE (H): ICD-10-CM

## 2023-06-30 DIAGNOSIS — K76.0 HEPATIC STEATOSIS: ICD-10-CM

## 2023-06-30 DIAGNOSIS — Z00.00 ROUTINE GENERAL MEDICAL EXAMINATION AT A HEALTH CARE FACILITY: Primary | ICD-10-CM

## 2023-06-30 DIAGNOSIS — Z12.11 SCREEN FOR COLON CANCER: ICD-10-CM

## 2023-06-30 LAB
HBA1C MFR BLD: 6.1 % (ref 0–5.6)
TSH SERPL DL<=0.005 MIU/L-ACNC: 2.91 UIU/ML (ref 0.3–4.2)

## 2023-06-30 PROCEDURE — 99396 PREV VISIT EST AGE 40-64: CPT | Performed by: STUDENT IN AN ORGANIZED HEALTH CARE EDUCATION/TRAINING PROGRAM

## 2023-06-30 PROCEDURE — 83036 HEMOGLOBIN GLYCOSYLATED A1C: CPT | Performed by: STUDENT IN AN ORGANIZED HEALTH CARE EDUCATION/TRAINING PROGRAM

## 2023-06-30 PROCEDURE — 36415 COLL VENOUS BLD VENIPUNCTURE: CPT | Performed by: STUDENT IN AN ORGANIZED HEALTH CARE EDUCATION/TRAINING PROGRAM

## 2023-06-30 PROCEDURE — 99214 OFFICE O/P EST MOD 30 MIN: CPT | Mod: 25 | Performed by: STUDENT IN AN ORGANIZED HEALTH CARE EDUCATION/TRAINING PROGRAM

## 2023-06-30 RX ORDER — SEMAGLUTIDE 1 MG/.5ML
1 INJECTION, SOLUTION SUBCUTANEOUS WEEKLY
Qty: 6 ML | Refills: 3 | Status: SHIPPED | OUTPATIENT
Start: 2023-08-29 | End: 2023-12-12 | Stop reason: DRUGHIGH

## 2023-06-30 RX ORDER — SEMAGLUTIDE 0.25 MG/.5ML
0.25 INJECTION, SOLUTION SUBCUTANEOUS WEEKLY
Qty: 2 ML | Refills: 0 | Status: SHIPPED | OUTPATIENT
Start: 2023-06-30 | End: 2023-07-28

## 2023-06-30 RX ORDER — SEMAGLUTIDE 0.5 MG/.5ML
0.5 INJECTION, SOLUTION SUBCUTANEOUS WEEKLY
Qty: 2 ML | Refills: 0 | Status: SHIPPED | OUTPATIENT
Start: 2023-07-28 | End: 2023-08-25

## 2023-06-30 ASSESSMENT — ENCOUNTER SYMPTOMS
PALPITATIONS: 0
HEADACHES: 0
FREQUENCY: 0
DYSURIA: 0
ABDOMINAL PAIN: 1
DIZZINESS: 0
SORE THROAT: 0
PARESTHESIAS: 0
WEAKNESS: 0
MYALGIAS: 0
COUGH: 0
JOINT SWELLING: 1
HEMATOCHEZIA: 0
SHORTNESS OF BREATH: 0
HEARTBURN: 0
ARTHRALGIAS: 1
FEVER: 0
BREAST MASS: 0
DIARRHEA: 0
CONSTIPATION: 0
EYE PAIN: 0
NAUSEA: 0
HEMATURIA: 0
CHILLS: 0
NERVOUS/ANXIOUS: 0

## 2023-06-30 ASSESSMENT — PAIN SCALES - GENERAL: PAINLEVEL: MILD PAIN (2)

## 2023-06-30 NOTE — TELEPHONE ENCOUNTER
Central Prior Authorization Team   Phone: [phone]        Prior Authorization Request from [employer]:     MyC Medical Advice  Open     6/30/2023  [facility]   Lynn Eid MD  Family Medicine  Prior Auth - Medication   Reason for call     All Conversations: Prior Auth - Medication  (Newest Message First)  June 30, 2023  Kassi June  to [facility]      6/30/23 11:29 AM  Wegovy 0.25mg/0.5ml soaj   Nis, June         6/30/23 11:29 AM  Note  Prior Authorization Retail Medication Request     Medication/Dose: Wegovy 0.25mg/0.5ml soaj  ICD code (if different than what is on RX):  Previously Tried and Failed:  Rationale:      Insurance Name: [employer]  Insurance ID: [id number]        Pharmacy Information (if different than what is on RX)  Name: [facility]         Phone: [phone]             Behrendt, Julie, RN  to Alena Ferro          6/30/23  9:12 AM  Hello,     I see you have an appointment today with Dr. Eid and you have a standing order for an A1C.     Thank you,   Julie Behrendt RN    This [employer] message has not been read.  Alena Ferro  to [facility] (supporting Lynn Eid MD)          6/30/23  7:29 AM  Hello,   Yes please, I would like an A1C test, especially since my glucose number has never been that high before.   Thanks,  Alena  This encounter is not signed. The conversation may still be ongoing.  Additional Documentation    Encounter Info:   Billing Info,   History,   Allergies,   Detailed Report        SmartForms     SOT Pharmacy Call Tracking     Destination Communication History    Coordination has not been started for this encounter.     Durable Medical Equipment Communication History    Coordination has not been started for this encounter.     Dialysis/Infusion Communication History    Coordination has not been started for this encounter.     Home  Medical Care Communication History    Coordination has not been started for this encounter.     Community Resources Communication History    Coordination has not been started for this encounter.     Payer Communication History    No communications have been sent for this encounter.  Orders Placed     None  Medication Renewals and Changes       None  Medication List  Visit Diagnoses       None  Problem List

## 2023-06-30 NOTE — PROGRESS NOTES
"   SUBJECTIVE:   CC: Alena is an 58 year old who presents for preventive health visit.   Chief Complaint   Patient presents with     Physical     Weight Problem         6/30/2023     8:45 AM   Additional Questions   Roomed by Eli ESTRADA   Accompanied by Self     Healthy Habits:     Getting at least 3 servings of Calcium per day:  Yes    Bi-annual eye exam:  Yes    Dental care twice a year:  Yes    Sleep apnea or symptoms of sleep apnea:  None    Diet:  Regular (no restrictions)    Frequency of exercise:  None    Taking medications regularly:  Yes    Medication side effects:  Not applicable    Additional concerns today:  Yes    S/p hysterectomy with BSO    Colon cancer screening fit test in the past.     Weight:   Current weight 303.   Fear about diabetes. fam hx. Mom and dad  Knee arthritis. Left kneee painful  Now fatty liver starting  Mother just dx with fatty liver. Just dx with cirrhosis.   Concerned she is \"slipping into a place\" doesn't waant to deal with sxs of being heavvy     Diet: pretty good. Love fruits and vegetables. Will choose healthier grains.   Downside is sweets. But doing better. Cut way back. And increased butter intake. Mother cooks with butter    Physical activity:  Null for last 6 months with the health issues.   Normally just walking the dog, gardening.   Want to get an e bike.     \"eat without thinking\" concentration problem.   Been on wellbutrin in the past which stabilized weight.     Went on adderall int he past with ADD, lost 40 lbs over a couple years.   But also making weird life choices. Then stopped adderall.   Would like to avoid those feelings if she can help it.     Hungry all the time.   Nausea/pain over the past few months have made this better. But also hasn't been able to stay active.     Social History     Tobacco Use     Smoking status: Never     Smokeless tobacco: Never   Substance Use Topics     Alcohol use: Not on file             6/30/2023     8:41 AM   Alcohol Use "   Prescreen: >3 drinks/day or >7 drinks/week? No     Reviewed orders with patient.  Reviewed health maintenance and updated orders accordingly - Yes    Breast Cancer Screening:    FHS-7:       5/5/2023     1:37 PM 6/6/2023    10:06 AM 6/7/2023     3:33 PM 6/30/2023     8:42 AM   Breast CA Risk Assessment (FHS-7)   Did any of your first-degree relatives have breast or ovarian cancer? Yes Yes No Yes   Did any of your relatives have bilateral breast cancer? No No No No   Did any man in your family have breast cancer? No No No No   Did any woman in your family have breast and ovarian cancer? Yes Yes No No   Did any woman in your family have breast cancer before age 50 y? No No No No   Do you have 2 or more relatives with breast and/or ovarian cancer? Yes Yes No Yes   Do you have 2 or more relatives with breast and/or bowel cancer? No Yes No Yes       Mammogram Screening: Recommended mammography every 1-2 years with patient discussion and risk factor consideration  Pertinent mammograms are reviewed under the imaging tab.    History of abnormal Pap smear: NO - age 30-65 PAP every 5 years with negative HPV co-testing recommended      Latest Ref Rng & Units 3/10/2023     4:17 PM   PAP / HPV   PAP  Negative for Intraepithelial Lesion or Malignancy (NILM)    HPV 16 DNA Negative Negative    HPV 18 DNA Negative Negative    Other HR HPV Negative Negative      Reviewed and updated as needed this visit by clinical staff   Tobacco  Allergies  Meds              Reviewed and updated as needed this visit by Provider                   Review of Systems   Constitutional: Negative for chills and fever.   HENT: Negative for congestion, ear pain, hearing loss and sore throat.    Eyes: Negative for pain and visual disturbance.   Respiratory: Negative for cough and shortness of breath.    Cardiovascular: Negative for chest pain, palpitations and peripheral edema.   Gastrointestinal: Positive for abdominal pain. Negative for constipation,  "diarrhea, heartburn, hematochezia and nausea.   Breasts:  Negative for tenderness, breast mass and discharge.   Genitourinary: Positive for pelvic pain. Negative for dysuria, frequency, genital sores, hematuria, urgency, vaginal bleeding and vaginal discharge.   Musculoskeletal: Positive for arthralgias and joint swelling. Negative for myalgias.   Skin: Negative for rash.   Neurological: Negative for dizziness, weakness, headaches and paresthesias.   Psychiatric/Behavioral: Negative for mood changes. The patient is not nervous/anxious.         OBJECTIVE:   /84 (BP Location: Right arm, Patient Position: Sitting, Cuff Size: Adult Large)   Pulse 70   Temp 98  F (36.7  C) (Tympanic)   Resp 16   Ht 1.772 m (5' 9.75\")   Wt 137.4 kg (303 lb)   SpO2 97%   BMI 43.79 kg/m    Physical Exam  GENERAL: healthy, alert and no distress  EYES: Eyes grossly normal to inspection, and conjunctivae and sclerae normal  HENT: ear canals and TM's normal, nose and mouth without ulcers or lesions  NECK: no adenopathy, no asymmetry, masses, or scars  RESP: lungs clear to auscultation - no rales, rhonchi or wheezes  CV: regular rate and rhythm, normal S1 S2, no S3 or S4, no murmur, click or rub, no peripheral edema   ABDOMEN: soft, nontender, no hepatosplenomegaly, no masses and bowel sounds normal. Incision sites well healed.   MS: no gross musculoskeletal defects noted, no edema  SKIN: no suspicious lesions or rashes  NEURO: Normal strength and tone, mentation intact and speech normal  PSYCH: mentation appears normal, affect normal/bright     Results from this visit  Results for orders placed or performed in visit on 06/30/23   Hemoglobin A1c     Status: Abnormal   Result Value Ref Range    Hemoglobin A1C 6.1 (H) 0.0 - 5.6 %         ASSESSMENT/PLAN:   Alena was seen today for physical and weight problem.    Diagnoses and all orders for this visit:    Routine general medical examination at a health care facility  Very pleasant 58 " year old who is recent s/p hysterectomy with BSO, with some improvement in abdominal pain. For annual visit, had labs completed recently revealing normal LFT and renal function, normal cholesterol except mildly elevated Trigs.     Hepatic steatosis  Class 3 severe obesity without serious comorbidity with body mass index (BMI) of 40.0 to 44.9 in adult, unspecified obesity type (H)  -     TSH with free T4 reflex; Future  Prediabetes  Patient has a history of prediabetes. Recent lab testing with elevated glucose compared to previously, so a1c repeated today and still in prediabetes range at 6.1. She is already working on dietary changes with decreased sugary food intake. Does have issues with grazing and overeating, which has historically been an issue for her. Has been on wellbutrin in the past which helped stagnate weight, but not lose. 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 We opted for injectable medication today. Given that she is in prediabetes range and not diabetes range, we did send Wegovy today, will await insurance approval. In addition to a1c, we did obtain TSH to ensure thyroid is not contributing factor for difficulty losing weight.   -     Hemoglobin A1c  -     Semaglutide-Weight Management (WEGOVY) 0.25 MG/0.5ML pen; Inject 0.25 mg Subcutaneous once a week for 28 days  -     Semaglutide-Weight Management (WEGOVY) 0.5 MG/0.5ML pen; Inject 0.5 mg Subcutaneous once a week for 28 days  -     Semaglutide-Weight Management (WEGOVY) 1 MG/0.5ML pen; Inject 1 mg Subcutaneous once a week  -     insulin pen needle (32G X 4 MM) 32G X 4 MM miscellaneous; Use 1 pen needles daily or as directed.    Screen for colon cancer  -     Fecal colorectal cancer screen (FIT); Future  -     Fecal colorectal cancer screen (FIT)        Patient has been advised of split billing requirements and indicates understanding:  Yes      COUNSELING:  Reviewed preventive health counseling, as reflected in patient instructions        She reports that she has never smoked. She has never used smokeless tobacco.          Lynn Eid MD  [facility]

## 2023-06-30 NOTE — PATIENT INSTRUCTIONS

## 2023-07-03 ENCOUNTER — TELEPHONE (OUTPATIENT)
Dept: OBGYN | Facility: CLINIC | Age: 58
End: 2023-07-03
Payer: COMMERCIAL

## 2023-07-03 NOTE — TELEPHONE ENCOUNTER
Panel Management Review        Health Maintenance List    Health Maintenance   Topic Date Due     ADVANCE CARE PLANNING  Never done     HEPATITIS B IMMUNIZATION (1 of 3 - 3-dose series) Never done     COLORECTAL CANCER SCREENING  Never done     INFLUENZA VACCINE (1) 09/01/2023     YEARLY PREVENTIVE VISIT  06/30/2024     MAMMO SCREENING  06/07/2025     HPV TEST  03/10/2028     PAP  03/10/2028     LIPID  06/28/2028     DTAP/TDAP/TD IMMUNIZATION (3 - Td or Tdap) 03/10/2033     HEPATITIS C SCREENING  Completed     HIV SCREENING  Completed     PHQ-2 (once per calendar year)  Completed     ZOSTER IMMUNIZATION  Completed     COVID-19 Vaccine  Completed     Pneumococcal Vaccine: Pediatrics (0 to 5 Years) and At-Risk Patients (6 to 64 Years)  Aged Out     IPV IMMUNIZATION  Aged Out     MENINGITIS IMMUNIZATION  Aged Out       Composite cancer screening  Chart review shows that this patient is due/due soon for the following Colonoscopy  No results found for: PAP  Past Surgical History:   Procedure Laterality Date     DILATION AND CURETTAGE, HYSTEROSCOPY DIAGNOSTIC, COMBINED N/A 5/8/2023    Procedure: HYSTEROSCOPY, DIAGNOSTIC, WITH DILATION AND CURETTAGE OF UTERUS, endometrial sampling with myosure;  Surgeon: Eli Manzano MD;  Location: [facility] OR     HYSTERECTOMY, TOTAL, LAPAROSCOPIC, WITH SALPINGO-OOPHORECTOMY, CYSTOSCOPY Bilateral 6/14/2023    Procedure: HYSTERECTOMY, TOTAL, LAPAROSCOPIC WITH BILATERAL SALPINGO_OOPHERECTOMY and CYSTOSCOPY;  Surgeon: Eli Manzano MD;  Location: [facility] OR       Is hysterectomy listed in surgical history? Yes   Is mastectomy listed in surgical history? No     Summary:    Patient is due/failing the following:   Colonoscopy    Action needed: Patient needs office visit for colonoscopy.    Type of outreach:  Sent [employer] message.      Staff Signature:  Yaritza Chu MA

## 2023-07-03 NOTE — LETTER
July 3, 2023      Alena Ferro  [address] [facility] [address]        Dear Alena,     To ensure we are providing the best quality care, we have reviewed your chart and see that you are due for:    Colon Cancer Screening:    If you have received a referral to schedule a Colonoscopy or choose to do a Colonoscopy instead of the FIT/ Cologuard test, please call [phone] to schedule.    If you have received a FIT test kit from a prior office visit, please check the expiration date. If , please get a new kit from the Lab/ Clinic. If not , please complete the test and mail in as soon as you are able.    If you would prefer to complete a Cologuard test, please reach out to your provider to see if this is an option for you.    You may call Dept: [phone] if you have any questions. If you have completed the tests outside of [facility], please have the results forwarded to our office Fax # [phone]. We will update the chart for your primary Physician to review before your next annual physical.        Sincerely,        Eli Manzano MD

## 2023-07-05 ENCOUNTER — TELEPHONE (OUTPATIENT)
Dept: OBGYN | Facility: CLINIC | Age: 58
End: 2023-07-05

## 2023-07-05 NOTE — TELEPHONE ENCOUNTER
S-(situation): diarrhea since Friday / pelvic pain    B-(background): 6/14/2023 HYSTERECTOMY, TOTAL, LAPAROSCOPIC WITH BILATERAL SALPINGO_OOPHERECTOMY and CYSTOSCOPY    A-(assessment): Patient reports onset of diarrhea since Friday. Patient reports diarrhea has been green in color and has slowed some since taking probiotics. No bloody diarrhea. Patient is not sure what this may be from. Patient denies any fevers or sweats/chills. Patient denies nausea or vomiting. Patient reports she had the surgery for some pelvic pain she has been experiencing. Patient reports she feels this pelvic pain is returning and not fixed from the surgery. Patient reports post op pain is well controlled.     Patient asking if she should be seen as MD thought maybe pain was from intestines so a colonoscopy should be considered. Patient states she needs a diagnostic colonoscopy and asking of Dr. Manzano can order that. Patient tried to schedule routine screening and was told diagnostic was needed.    R-(recommendations): Reassurance provided. Continue to monitor, consider Imodium for diarrhea. Will send message to Dr. Manzano to see what she recommends.    Please review and advise.    Katya ABEL   Ob[facility]yn Clinic

## 2023-07-06 DIAGNOSIS — R10.2 PELVIC PAIN IN FEMALE: Primary | ICD-10-CM

## 2023-07-06 NOTE — TELEPHONE ENCOUNTER
If pain is similar to pre op and is not worse/uncontrollable with nausea, vomiting, fevers, heavy bleeding, etc. I think it is reasonable to order the diagnostic colonoscopy so she can get on the books and have her follow up at her post op visit at 6 weeks. Sooner to see me if new symptoms, worsening develop.  If at 6 weeks she has recovered from surgery she can complete the colonoscopy.     If continuing to have profuse diarrhea, please order a C diff test for her to leave a sample.     Eli Manzano MD          Return call to patient.  Spoke with patient on the phone.  Patient reports Imodium has been helping and the diarrhea is improving.   Patient does not feel she needs to test for C. Diff  Patient would like an order for the diagnostic coloscopy.    Please order and patient will wait for a  to call to arrange.    Thanks    Katya ABEL   Ob/Gyn Clinic

## 2023-07-06 NOTE — TELEPHONE ENCOUNTER
Prior Authorization Approval    Authorization Effective Date: 7/6/2023  Authorization Expiration Date: 2/6/2024  Medication: Semaglutide-Weight Management (WEGOVY) 0.25 MG/0.5ML pen  Approved Dose/Quantity:    Reference #:     Insurance Company: [employer] - Phone [phone] Fax [phone]  Expected CoPay:       CoPay Card Available:      Foundation Assistance Needed:    Which Pharmacy is filling the prescription (Not needed for infusion/clinic administered): [facility], [place] - [address] [place] [address]  Pharmacy Notified: Yes  Patient Notified: Yes  **Instructed pharmacy to notify patient when script is ready to /ship.**

## 2023-07-06 NOTE — TELEPHONE ENCOUNTER
Central Prior Authorization Team   Phone: [phone]    PA Initiation    Medication: Semaglutide-Weight Management (WEGOVY) 0.25 MG/0.5ML pen  Insurance Company: [employer] - Phone [phone] Fax [phone]  Pharmacy Filling the Rx: [facility] [place], [place] - [address]  Filling Pharmacy Phone: [phone]  Filling Pharmacy Fax:    Start Date: 7/6/2023

## 2023-07-13 ENCOUNTER — TELEPHONE (OUTPATIENT)
Dept: OBGYN | Facility: CLINIC | Age: 58
End: 2023-07-13
Payer: COMMERCIAL

## 2023-07-13 DIAGNOSIS — R19.7 DIARRHEA OF PRESUMED INFECTIOUS ORIGIN: ICD-10-CM

## 2023-07-13 NOTE — TELEPHONE ENCOUNTER
Reason for call:  Patient reporting a symptom    Symptom or request: Pt had hysterectomy 6/14/23.  F/up again - has been having diarrhea for last two weeks.  Tried imodium - helped some but still having issues.    Has been exhausted as well.    Duration (how long have symptoms been present): 2 weeks    Have you been treated for this before? No    Additional comments:     Phone Number patient can be reached at:  Home number on file [phone] (home)    Best Time:      Can we leave a detailed message on this number:  YES    Call taken on 7/13/2023 at 3:21 PM by Rach Berg

## 2023-07-13 NOTE — TELEPHONE ENCOUNTER
S/P hysterectomy on 6/14/23  C/O diarrhea for last two weeks  Did start WeGovy 1 week ago, but very insistent that the diarrhea is not related to WeGovy    No c/o incision concerns   Denies pain  Denies bloating  Denies fever  Eating/drinking normally    Talked to patient and stated that this concern is best addressed with FP office, [place]. Routing to their team to call patient and triage.       Next OV 7/27 with OBGYN

## 2023-07-14 ENCOUNTER — TELEPHONE (OUTPATIENT)
Dept: SURGERY | Facility: CLINIC | Age: 58
End: 2023-07-14
Payer: COMMERCIAL

## 2023-07-14 NOTE — TELEPHONE ENCOUNTER
Screening Questions  BLUE  KIND OF PREP RED  LOCATION [review exclusion criteria] GREEN  SEDATION TYPE        Y Are you active on mychart?       Youn Ordering/Referring Provider?        [employer] What type of coverage do you have?      N Have you had a positive covid test in the last 14 days?     43.79 1. BMI  [BMI 40+ - review exclusion criteria& smart-phrase document]    Y  2. Are you able to give consent for your medical care? [IF NO,RN REVIEW]          N  3. Are you taking any prescription pain medications on a routine schedule   (ex narcotics: oxycodone, roxicodone, oxycontin,  and percocet)? [RN Review]        N  3a. EXTENDED PREP What kind of prescription?     N 4. Do you have any chemical dependencies such as alcohol, street drugs, or methadone?        **If yes 3- 5 , please schedule with MAC sedation.**          IF YES TO ANY 6 - 10 - HOSPITAL SETTING ONLY.     N 6.   Do you need assistance transferring?     N 7.   Have you had a heart or lung transplant?    N 8.   Are you currently on dialysis?   N 9.   Do you use daily home oxygen?   N 10. Do you take nitroglycerin?   10a. N If yes, how often?     N 11. Are you currently pregnant?    11a. N If yes, how many weeks? [ Greater than 12 weeks, OR NEEDED]    N 12. Do you have Pulmonary Hypertension? *NEED PAC APPT AT [facility] w/ [facility]*     N 13. [review exclusion criteria]  Do you have any implantable devices in your body (pacemaker, defib, LVAD)?    N 14. In the past 6 months, have you had any heart related issues including cardiomyopathy or heart attack?     14a. N If yes, did it require cardiac stenting if so when?     N 15. Have you had a stroke or Transient ischemic attack (TIA - aka  mini stroke ) within 6 months?      N 16. Do you have mod to severe Obstructive Sleep Apnea?  [Hospital only]    N 17. Do you have SEVERE AND UNCONTROLLED asthma? *NEED PAC APPT AT [facility] w/[facility]*     18.Do you take blood thinners?  No    Y 19. Do you take any of the following  "medications?    NPhentermine    NOzempic    YWegovy (Semaglutide)      19a. If yes, \"Hold for 7 days before procedure.  Please consult your prescribing provider if you have questions about holding this medication.\"     N  20. Do you have chronic kidney disease?      N  21. Do you have a diagnosis of diabetes?     N  22. On a regular basis do you go 3-5 days between bowel movements?     See below 23. Preferred [facility] for Pre Prescription        [address]        - CLOSING REMINDERS -  You will receive a call from a Nurse to review instructions and health history.  This assessment must be completed prior to your procedure.  Failure to complete the Nurse assessment may result in the procedure being cancelled.    On the day of your procedure, please designatean adult(s) who can drive you home stay with you for the next 24 hours. The medicines used in the exam will make you sleepy. You will not be able to drive.    You cannot take public transportation, ride share services, or non-medical taxi service without a responsible caregiver.  Medical transport services are allowed with the requirement that a responsible caregiver will receive you at your destination.  We require that drivers and caregivers are confirmed prior to your procedure.      - SCHEDULING DETAILS -  N & N Hospital Setting Required & If yes, what is the exclusion?   Ever  Surgeon    8-14-23  Date of Procedure  Lower Endoscopy [Colonoscopy]  Type of Procedure Scheduled  [facility] - If you answer yes to questions #1, #3, #22 (De [place] and CF pts)Which Colonoscopy Prep was Sent?     GEN Sedation Type     N PAC / Pre-op Required              "

## 2023-07-18 ENCOUNTER — E-VISIT (OUTPATIENT)
Dept: FAMILY MEDICINE | Facility: CLINIC | Age: 58
End: 2023-07-18
Payer: COMMERCIAL

## 2023-07-18 DIAGNOSIS — R19.7 DIARRHEA OF PRESUMED INFECTIOUS ORIGIN: Primary | ICD-10-CM

## 2023-07-18 PROCEDURE — 99421 OL DIG E/M SVC 5-10 MIN: CPT | Performed by: STUDENT IN AN ORGANIZED HEALTH CARE EDUCATION/TRAINING PROGRAM

## 2023-07-18 RX ORDER — AZITHROMYCIN 500 MG/1
500 TABLET, FILM COATED ORAL DAILY
Qty: 3 TABLET | Refills: 0 | Status: SHIPPED | OUTPATIENT
Start: 2023-07-18 | End: 2023-07-21

## 2023-07-18 NOTE — PATIENT INSTRUCTIONS
Thank you for choosing us for your care. I have placed an order for a prescription so that you can start treatment. View your full visit summary for details by clicking on the link below. Your pharmacist will able to address any questions you may have about the medication.     If you re not feeling better within 2-3 days, please schedule an appointment.  You can schedule an appointment right here in [place], or call [phone]  If the visit is for the same symptoms as your eVisit, we ll refund the cost of your eVisit if seen within seven days.    Thank you for choosing us for your care. Given your symptoms, I would like you to do a lab-only visit to determine what is causing them.  I have placed the orders.  Please schedule an appointment with the lab right here in [place], or call [phone].  I will let you know when the results are back and next steps to take.    Diarrhea with Uncertain Cause (Adult)    Diarrhea is when stools are loose and watery. This can be caused by:     Viral infections    Bacterial infections    Food poisoning    Parasites    Irritable bowel syndrome (IBS)    Inflammatory bowel diseases such as ulcerative colitis, Crohn's disease, and celiac disease    Food intolerance, such as to lactose, the sugar found in milk and milk products    Reaction to medicines like antibiotics, laxatives, cancer medicines, and antacids  Along with diarrhea, you may also have:    Abdominal pain and cramping    Nausea and vomiting    Loss of bowel control    Fever and chills    Bloody stools  In some cases, antibiotics may help to treat diarrhea. You may have a stool sample test which is done to see what is causing your diarrhea, and if antibiotics will help treat it. The results of a stool sample test may take up to 2 days. The healthcare provider may not give you antibiotics until they have the stool test results.   Diarrhea can cause dehydration. This is the loss of too much water and other fluids from  the body. When this occurs, you must replace those body fluids. This can be done with oral rehydration solutions. Oral rehydration solutions are available at drugstores and grocery stores without a prescription. Sports drinks are not the best choice if you are very dehydrated. They usually have too much sugar and not enough electrolytes.   Home care  Follow all instructions given by your healthcare provider. Rest at home for the next 24 hours, or until you feel better. Avoid caffeine, tobacco, and alcohol. These can make diarrhea, cramping, and pain worse.   If taking medicines:    Over-the-counter nausea and diarrhea medicines are generally OK unless you experience fever or blood in the stool. Check with your healthcare provider first in those circumstances.    You may use acetaminophen or nonsteroidal anti-inflammatory drugs (NSAIDs) such as ibuprofen or naproxen to reduce pain and fever. Don t use these if you have chronic liver or kidney disease, or ever had a stomach ulcer or gastrointestinal bleeding. Don't use NSAID medicines if you are already taking one for another condition (like arthritis) or are on daily aspirin therapy (such as for heart disease or after a stroke). Talk with your healthcare provider first.    If antibiotics were prescribed, be sure you take them until they are finished. Don t stop taking them even when you feel better. Antibiotics must be taken exactly as prescribed.  To prevent the spread of illness:    Remember that washing with soap and clean, running water and using alcohol-based  is the best way to prevent the spread of infection. Dry your hands with a single-use towel (like a paper towel).    Clean the toilet after each use.    Wash your hands before eating.    Wash your hands before and after preparing food. Keep in mind that people with diarrhea or vomiting should not prepare food for others.    Wash your hands after using cutting boards, counter tops, and knives that  have been in contact with raw foods.    Wash and then peel fruits and vegetables.    Keep uncooked meats away from cooked and ready-to-eat foods.    Use a food thermometer when cooking. Cook poultry to at least 165 F (74 C). Cook ground meat (beef, veal, pork, lamb) to at least 160 F (71 C). Cook fresh beef, veal, lamb, and pork to at least 145 F (63 C).    Don t eat raw or undercooked eggs (poached or hayden side up), poultry, meat, or unpasteurized milk and juices.  Food and drinks  The main goal while treating vomiting or diarrhea is to prevent dehydration. This is done by taking small amounts of liquids often.     Keep in mind that liquids are more important than food right now.    Drink only small amounts of liquids at a time.    Don t force yourself to eat, especially if you are having cramping, vomiting, or diarrhea. Don t eat large amounts at a time, even if you are hungry.    If you eat, avoid fatty, greasy, spicy, or fried foods.    Don t eat dairy foods or drink milk if you have diarrhea. These can make diarrhea worse.  During the first 24 hours you can try:    Oral rehydration solutions.  Sports drinks may be used if you are not too dehydrated and are otherwise healthy.    Soft drinks without caffeine    Ginger ale    Water (plain or flavored)    Decaf tea or coffee    Clear broth, consommé, or bouillon    Gelatin, ice pops, or frozen fruit juice bars  The second 24 hours, if you are feeling better, you can add:    Hot cereal, plain toast, bread, rolls, or crackers    Plain noodles, rice, mashed potatoes, chicken noodle soup, or rice soup    Applesauce, unsweetened canned fruit (no pineapple)    Bananas  As you recover:    Limit fat intake to less than 15 grams per day. Don t eat margarine, butter, oils, mayonnaise, sauces, gravies, fried foods, peanut butter, meat, poultry, or fish.    Limit fiber. Don t eat raw or cooked vegetables, fresh fruits except bananas, or bran cereals.    Limit caffeine and  chocolate.    Limit dairy.    Don t use spices or seasonings except salt.    Go back to your normal diet over time, as you feel better and your symptoms improve.    If the symptoms come back, go back to a simple diet or clear liquids.  Follow-up care  Follow up with your healthcare provider, or as advised. If a stool sample was taken or cultures were done, call the healthcare provider for the results as instructed.   Call 911  Call 911 if you have any of these symptoms:     Trouble breathing    Confusion    Extreme drowsiness or trouble walking    Loss of consciousness    Rapid heart rate    Chest pain    Stiff neck    Seizure  When to get medical advice  Call your healthcare provider right away if any of these occur:     Abdominal pain that gets worse    Constant lower right abdominal pain    Continued vomiting and inability to keep liquids down    Diarrhea more than 5 times a day    Blood in vomit or stool    Dark urine or no urine for 8 hours, dry mouth and tongue, tiredness, weakness, or dizziness    Drowsiness    New rash    You don t get better in 2 to 3 days    Fever of 100.4 F (38 C) or higher, or as advised by your provider  Jaimie last reviewed this educational content on 2/1/2022 2000-2022 [employer]. All rights reserved. This information is not intended as a substitute for professional medical care. Always follow your healthcare professional's instructions.

## 2023-07-20 ENCOUNTER — TELEPHONE (OUTPATIENT)
Dept: FAMILY MEDICINE | Facility: CLINIC | Age: 58
End: 2023-07-20
Payer: COMMERCIAL

## 2023-07-20 PROCEDURE — 87507 IADNA-DNA/RNA PROBE TQ 12-25: CPT | Performed by: STUDENT IN AN ORGANIZED HEALTH CARE EDUCATION/TRAINING PROGRAM

## 2023-07-21 LAB
ADV 40+41 DNA STL QL NAA+NON-PROBE: NEGATIVE
ASTRO TYP 1-8 RNA STL QL NAA+NON-PROBE: NEGATIVE
C CAYETANENSIS DNA STL QL NAA+NON-PROBE: NEGATIVE
CAMPYLOBACTER DNA SPEC NAA+PROBE: NEGATIVE
CRYPTOSP DNA STL QL NAA+NON-PROBE: NEGATIVE
E COLI O157 DNA STL QL NAA+NON-PROBE: NORMAL
E HISTOLYT DNA STL QL NAA+NON-PROBE: NEGATIVE
EAEC ASTA GENE ISLT QL NAA+PROBE: NEGATIVE
EC STX1+STX2 GENES STL QL NAA+NON-PROBE: NEGATIVE
EPEC EAE GENE STL QL NAA+NON-PROBE: NEGATIVE
ETEC LTA+ST1A+ST1B TOX ST NAA+NON-PROBE: NEGATIVE
G LAMBLIA DNA STL QL NAA+NON-PROBE: NEGATIVE
NOROVIRUS GI+II RNA STL QL NAA+NON-PROBE: NEGATIVE
P SHIGELLOIDES DNA STL QL NAA+NON-PROBE: NEGATIVE
RVA RNA STL QL NAA+NON-PROBE: NEGATIVE
SALMONELLA SP RPOD STL QL NAA+PROBE: NEGATIVE
SAPO I+II+IV+V RNA STL QL NAA+NON-PROBE: NEGATIVE
SHIGELLA SP+EIEC IPAH ST NAA+NON-PROBE: NEGATIVE
V CHOLERAE DNA SPEC QL NAA+PROBE: NEGATIVE
VIBRIO DNA SPEC NAA+PROBE: NEGATIVE
Y ENTEROCOL DNA STL QL NAA+PROBE: NEGATIVE

## 2023-07-24 ENCOUNTER — TELEPHONE (OUTPATIENT)
Dept: OBGYN | Facility: CLINIC | Age: 58
End: 2023-07-24
Payer: COMMERCIAL

## 2023-07-24 NOTE — TELEPHONE ENCOUNTER
"No pain or cramping   No fevers    Patient reports 3 weeks of diarrhea   Settled down -planning to do a C-diff      S-(situation): bleeding and small clot today, currently at the grocery store, unaware of what her bleeding is like    B-(background): 6/14/2023 HYSTERECTOMY, TOTAL, LAPAROSCOPIC WITH BILATERAL SALPINGO_OOPHERECTOMY and CYSTOSCOPY     A-(assessment): Patient reports noticing some bleeding this morning, jumped in the shower and dressed and left for errands. Patient denies any cramping or pain. Patient denies being dizzy or lightheaded. Patient denies being short of breath or feeling faint, weak or excessively tired. Patient denies clots larger than a golf ball or bleeding needing a maxi pad. Patient denies foul odor. 6 week post op appointment Thursday. Patient denies fevers or feeling unwell.    R-(recommendations): Reassurance provided. Continue to monitor. Call with further questions or concerns. Reviewed bleeding precautions and when to be seen. Reviewed being seen more emergently vs office visit.    Pt in agreement and reports understanding.  Patient stating \" you have put my mind at ease\"    Katya ABEL   [facility]        "

## 2023-07-24 NOTE — TELEPHONE ENCOUNTER
Reason for Call:  Other call back    Detailed comments: Pt called because she had a HYSTERECTOMY, TOTAL, LAPAROSCOPIC WITH BILATERAL SALPINGO_OOPHERECTOMY and CYSTOSCOPY 6/14/23. She is calling because she started bleeding today.     Phone Number Patient can be reached at: Cell number on file:    Telephone Information:   Mobile [phone]       Best Time:     Can we leave a detailed message on this number? YES    Call taken on 7/24/2023 at 9:42 AM by Devi Luke MA

## 2023-07-27 ENCOUNTER — OFFICE VISIT (OUTPATIENT)
Dept: OBGYN | Facility: CLINIC | Age: 58
End: 2023-07-27
Payer: COMMERCIAL

## 2023-07-27 VITALS
TEMPERATURE: 98.3 F | SYSTOLIC BLOOD PRESSURE: 162 MMHG | HEART RATE: 87 BPM | WEIGHT: 293 LBS | DIASTOLIC BLOOD PRESSURE: 95 MMHG | BODY MASS INDEX: 42.63 KG/M2

## 2023-07-27 DIAGNOSIS — Z09 POSTOP CHECK: Primary | ICD-10-CM

## 2023-07-27 PROCEDURE — 99024 POSTOP FOLLOW-UP VISIT: CPT | Performed by: OBSTETRICS & GYNECOLOGY

## 2023-07-27 NOTE — PROGRESS NOTES
[facility]  OB/GYN     CC: Post-op     SUBJECTIVE:    Alena is a 58 year old female who is 6w s/p TLH, BSO. Pelvic pain is still present but improved. Small clot on Monday, otherwise resolved  Ambulation: no issues  Eating/Drinking: no issues  Urination: no issues  Defecation: diarrhea, has had for three weeks, currently improved, has colonoscopy scheduled      Past Medical History:   Diagnosis Date    ADHD     Breast cyst     Ear pain     sharp pains    HSV-2 infection     Tinnitus        Past Surgical History:   Procedure Laterality Date    DILATION AND CURETTAGE, HYSTEROSCOPY DIAGNOSTIC, COMBINED N/A 5/8/2023    Procedure: HYSTEROSCOPY, DIAGNOSTIC, WITH DILATION AND CURETTAGE OF UTERUS, endometrial sampling with myosure;  Surgeon: Eli Manzano MD;  Location: [facility]    HYSTERECTOMY, TOTAL, LAPAROSCOPIC, WITH SALPINGO-OOPHORECTOMY, CYSTOSCOPY Bilateral 6/14/2023    Procedure: HYSTERECTOMY, TOTAL, LAPAROSCOPIC WITH BILATERAL SALPINGO_OOPHERECTOMY and CYSTOSCOPY;  Surgeon: Eli Manzano MD;  Location: [facility]       Current Outpatient Medications   Medication Sig Dispense Refill    insulin pen needle (32G X 4 MM) 32G X 4 MM miscellaneous Use 1 pen needles daily or as directed. 90 each 1    Semaglutide-Weight Management (WEGOVY) 0.25 MG/0.5ML pen Inject 0.25 mg Subcutaneous once a week for 28 days 2 mL 0    [START ON 7/28/2023] Semaglutide-Weight Management (WEGOVY) 0.5 MG/0.5ML pen Inject 0.5 mg Subcutaneous once a week for 28 days 2 mL 0    [START ON 8/29/2023] Semaglutide-Weight Management (WEGOVY) 1 MG/0.5ML pen Inject 1 mg Subcutaneous once a week 6 mL 3    valACYclovir (VALTREX) 1000 MG tablet Takes prn         OBJECTIVE:  BP (!) 162/95 (BP Location: Left arm, Patient Position: Chair, Cuff Size: Adult Large)   Pulse 87   Temp 98.3  F (36.8  C) (Tympanic)   Wt 133.8 kg (295 lb)   BMI 42.63 kg/m    Estimated body mass index is 42.63 kg/m  as calculated from the  "following:    Height as of 6/30/23: 1.772 m (5' 9.75\").    Weight as of this encounter: 133.8 kg (295 lb).    PHYSICAL EXAM:    GENERAL: Pleasant, talkative and in no apparent distress   HEENT:  Normocephalic, non-tender.     LUNGS:  No increased work of breathing   HEART:  Regular rate, well perfused   ABDOMEN:  Bowel sounds are present throughout.  The abdomen is soft, appropriately tender.   INCISION: C/D/I   : Vaginal cuff is well-healed on inspection and on palpation without evidence of vaginal bleeding   NEURO:  No focal deficits   MSK:  Lower extremities without edema or tenderness.          ASSESSMENT/PLAN: 58 year old female who presents for postop visit.  Patient doing well overall.  She plans to complete colonoscopy as still has some pelvic discomfort.  Did have an episode of bleeding that has since resolved.  Cuff is well-healed on examination today.  Patient does have some fatigue related to several weeks of diarrhea.  Discussed it is reasonable to do half weeks the next 2 weeks at work and then return to full-time hours.  Patient will notify us if she needs a letter to this effect.  Otherwise can resume normal activities.    Eli Manzano MD  [facility]     "

## 2023-07-27 NOTE — NURSING NOTE
"Initial BP (!) 162/95 (BP Location: Left arm, Patient Position: Chair, Cuff Size: Adult Large)   Pulse 87   Temp 98.3  F (36.8  C) (Tympanic)   Wt 133.8 kg (295 lb)   BMI 42.63 kg/m   Estimated body mass index is 42.63 kg/m  as calculated from the following:    Height as of 6/30/23: 1.772 m (5' 9.75\").    Weight as of this encounter: 133.8 kg (295 lb). .  aYritza Chu MA    "

## 2023-08-04 RX ORDER — BISACODYL 5 MG/1
TABLET, DELAYED RELEASE ORAL
Qty: 4 TABLET | Refills: 0 | Status: SHIPPED | OUTPATIENT
Start: 2023-08-04 | End: 2023-08-14

## 2023-08-11 ENCOUNTER — ANESTHESIA EVENT (OUTPATIENT)
Dept: GASTROENTEROLOGY | Facility: CLINIC | Age: 58
End: 2023-08-11
Payer: COMMERCIAL

## 2023-08-11 NOTE — ANESTHESIA PREPROCEDURE EVALUATION
Anesthesia Pre-Procedure Evaluation    Patient: Alena Ferro   MRN: [id number] : 1965        Procedure : Procedure(s):  Colonoscopy          Past Medical History:   Diagnosis Date    ADHD     Breast cyst     Ear pain     sharp pains    HSV-2 infection     Tinnitus       Past Surgical History:   Procedure Laterality Date    DILATION AND CURETTAGE, HYSTEROSCOPY DIAGNOSTIC, COMBINED N/A 2023    Procedure: HYSTEROSCOPY, DIAGNOSTIC, WITH DILATION AND CURETTAGE OF UTERUS, endometrial sampling with myosure;  Surgeon: Eli Manzano MD;  Location: [facility]    HYSTERECTOMY, TOTAL, LAPAROSCOPIC, WITH SALPINGO-OOPHORECTOMY, CYSTOSCOPY Bilateral 2023    Procedure: HYSTERECTOMY, TOTAL, LAPAROSCOPIC WITH BILATERAL SALPINGO_OOPHERECTOMY and CYSTOSCOPY;  Surgeon: Eli Manzano MD;  Location: [facility]      Allergies   Allergen Reactions    Shellfish Containing Products [Shellfish-Derived Products] Hives     Happens when eating [place] in [place]  No respiratory symptoms to date.     Amoxicillin Hives      Social History     Tobacco Use    Smoking status: Never    Smokeless tobacco: Never   Substance Use Topics    Alcohol use: Not on file      Wt Readings from Last 1 Encounters:   23 133.8 kg (295 lb)        Anesthesia Evaluation   Pt has had prior anesthetic. Type: MAC and General.    No history of anesthetic complications       ROS/MED HX  ENT/Pulmonary:       Neurologic:       Cardiovascular:       METS/Exercise Tolerance:     Hematologic:       Musculoskeletal:       GI/Hepatic:       Renal/Genitourinary:       Endo: Comment: Morbid obesity    (+)               Obesity,       Psychiatric/Substance Use:     (+) psychiatric history other (comment)       Infectious Disease:       Malignancy:       Other:               OUTSIDE LABS:  CBC:   Lab Results   Component Value Date    WBC 5.4 2023    WBC 6.1 03/10/2023    HGB 13.7 2023    HGB 14.4 2023    HCT 41.6  06/14/2023    HCT 43.5 03/10/2023     06/14/2023     03/10/2023     BMP:   Lab Results   Component Value Date     06/28/2023     03/10/2023    POTASSIUM 4.0 06/28/2023    POTASSIUM 3.7 03/10/2023    CHLORIDE 107 06/28/2023    CHLORIDE 107 03/10/2023    CO2 22 06/28/2023    CO2 24 03/10/2023    BUN 14.1 06/28/2023    BUN 12.7 03/10/2023    CR 0.84 06/28/2023    CR 0.96 (H) 03/10/2023     (H) 06/28/2023     (H) 03/10/2023     COAGS: No results found for: PTT, INR, FIBR  POC: No results found for: BGM, HCG, HCGS  HEPATIC:   Lab Results   Component Value Date    ALBUMIN 4.3 06/28/2023    PROTTOTAL 7.6 06/28/2023    ALT 32 06/28/2023    AST 28 06/28/2023    ALKPHOS 78 06/28/2023    BILITOTAL 0.4 06/28/2023     OTHER:   Lab Results   Component Value Date    A1C 6.1 (H) 06/30/2023    PLACIDO 9.6 06/28/2023    TSH 2.91 06/28/2023    SED 23 06/28/2023               UDAY Castillo CRNA

## 2023-08-14 ENCOUNTER — ANESTHESIA (OUTPATIENT)
Dept: GASTROENTEROLOGY | Facility: CLINIC | Age: 58
End: 2023-08-14
Payer: COMMERCIAL

## 2023-08-14 ENCOUNTER — HOSPITAL ENCOUNTER (OUTPATIENT)
Facility: CLINIC | Age: 58
Discharge: HOME | End: 2023-08-14
Attending: SURGERY | Admitting: SURGERY
Payer: COMMERCIAL

## 2023-08-14 VITALS
HEART RATE: 77 BPM | SYSTOLIC BLOOD PRESSURE: 146 MMHG | OXYGEN SATURATION: 97 % | BODY MASS INDEX: 41.95 KG/M2 | DIASTOLIC BLOOD PRESSURE: 99 MMHG | TEMPERATURE: 98.1 F | RESPIRATION RATE: 16 BRPM | HEIGHT: 70 IN | WEIGHT: 293 LBS

## 2023-08-14 DIAGNOSIS — Z12.11 SPECIAL SCREENING FOR MALIGNANT NEOPLASMS, COLON: Primary | ICD-10-CM

## 2023-08-14 LAB — COLONOSCOPY: NORMAL

## 2023-08-14 PROCEDURE — 45380 COLONOSCOPY AND BIOPSY: CPT | Performed by: SURGERY

## 2023-08-14 PROCEDURE — 88305 TISSUE EXAM BY PATHOLOGIST: CPT | Mod: TC | Performed by: SURGERY

## 2023-08-14 PROCEDURE — 370N000017 HC ANESTHESIA TECHNICAL FEE, PER MIN: Performed by: SURGERY

## 2023-08-14 PROCEDURE — 250N000011 HC RX IP 250 OP 636: Performed by: NURSE ANESTHETIST, CERTIFIED REGISTERED

## 2023-08-14 PROCEDURE — 250N000009 HC RX 250: Performed by: SURGERY

## 2023-08-14 PROCEDURE — 45378 DIAGNOSTIC COLONOSCOPY: CPT | Performed by: SURGERY

## 2023-08-14 PROCEDURE — 88305 TISSUE EXAM BY PATHOLOGIST: CPT | Mod: 26 | Performed by: PATHOLOGY

## 2023-08-14 PROCEDURE — 250N000009 HC RX 250: Performed by: NURSE ANESTHETIST, CERTIFIED REGISTERED

## 2023-08-14 PROCEDURE — 258N000003 HC RX IP 258 OP 636: Performed by: SURGERY

## 2023-08-14 RX ORDER — GLYCOPYRROLATE 0.2 MG/ML
INJECTION, SOLUTION INTRAMUSCULAR; INTRAVENOUS PRN
Status: DISCONTINUED | OUTPATIENT
Start: 2023-08-14 | End: 2023-08-14

## 2023-08-14 RX ORDER — PROPOFOL 10 MG/ML
INJECTION, EMULSION INTRAVENOUS CONTINUOUS PRN
Status: DISCONTINUED | OUTPATIENT
Start: 2023-08-14 | End: 2023-08-14

## 2023-08-14 RX ORDER — FLUMAZENIL 0.1 MG/ML
0.2 INJECTION, SOLUTION INTRAVENOUS
Status: DISCONTINUED | OUTPATIENT
Start: 2023-08-14 | End: 2023-08-14 | Stop reason: HOSPADM

## 2023-08-14 RX ORDER — SODIUM CHLORIDE, SODIUM LACTATE, POTASSIUM CHLORIDE, CALCIUM CHLORIDE 600; 310; 30; 20 MG/100ML; MG/100ML; MG/100ML; MG/100ML
INJECTION, SOLUTION INTRAVENOUS CONTINUOUS
Status: DISCONTINUED | OUTPATIENT
Start: 2023-08-14 | End: 2023-08-14 | Stop reason: HOSPADM

## 2023-08-14 RX ORDER — NALOXONE HYDROCHLORIDE 0.4 MG/ML
0.4 INJECTION, SOLUTION INTRAMUSCULAR; INTRAVENOUS; SUBCUTANEOUS
Status: DISCONTINUED | OUTPATIENT
Start: 2023-08-14 | End: 2023-08-14 | Stop reason: HOSPADM

## 2023-08-14 RX ORDER — LIDOCAINE HYDROCHLORIDE 10 MG/ML
INJECTION, SOLUTION INFILTRATION; PERINEURAL PRN
Status: DISCONTINUED | OUTPATIENT
Start: 2023-08-14 | End: 2023-08-14

## 2023-08-14 RX ORDER — ONDANSETRON 2 MG/ML
4 INJECTION INTRAMUSCULAR; INTRAVENOUS
Status: DISCONTINUED | OUTPATIENT
Start: 2023-08-14 | End: 2023-08-14 | Stop reason: HOSPADM

## 2023-08-14 RX ORDER — LIDOCAINE 40 MG/G
CREAM TOPICAL
Status: DISCONTINUED | OUTPATIENT
Start: 2023-08-14 | End: 2023-08-14 | Stop reason: HOSPADM

## 2023-08-14 RX ORDER — NALOXONE HYDROCHLORIDE 0.4 MG/ML
0.2 INJECTION, SOLUTION INTRAMUSCULAR; INTRAVENOUS; SUBCUTANEOUS
Status: DISCONTINUED | OUTPATIENT
Start: 2023-08-14 | End: 2023-08-14 | Stop reason: HOSPADM

## 2023-08-14 RX ADMIN — PROPOFOL 50 MG: 10 INJECTION, EMULSION INTRAVENOUS at 12:57

## 2023-08-14 RX ADMIN — PROPOFOL 200 MCG/KG/MIN: 10 INJECTION, EMULSION INTRAVENOUS at 12:39

## 2023-08-14 RX ADMIN — GLYCOPYRROLATE 0.2 MG: 0.2 INJECTION, SOLUTION INTRAMUSCULAR; INTRAVENOUS at 12:38

## 2023-08-14 RX ADMIN — LIDOCAINE HYDROCHLORIDE 0.1 ML: 10 INJECTION, SOLUTION EPIDURAL; INFILTRATION; INTRACAUDAL; PERINEURAL at 11:54

## 2023-08-14 RX ADMIN — LIDOCAINE HYDROCHLORIDE 30 MG: 10 INJECTION, SOLUTION INFILTRATION; PERINEURAL at 12:39

## 2023-08-14 RX ADMIN — PROPOFOL 60 MG: 10 INJECTION, EMULSION INTRAVENOUS at 12:40

## 2023-08-14 RX ADMIN — PROPOFOL 40 MG: 10 INJECTION, EMULSION INTRAVENOUS at 12:44

## 2023-08-14 RX ADMIN — SODIUM CHLORIDE, POTASSIUM CHLORIDE, SODIUM LACTATE AND CALCIUM CHLORIDE: 600; 310; 30; 20 INJECTION, SOLUTION INTRAVENOUS at 11:54

## 2023-08-14 ASSESSMENT — ACTIVITIES OF DAILY LIVING (ADL): ADLS_ACUITY_SCORE: 35

## 2023-08-14 NOTE — ANESTHESIA POSTPROCEDURE EVALUATION
Patient: Alena Ferro    Procedure: Procedure(s):  Colonoscopy with biopsies       Anesthesia Type:  General    Note:  Disposition: Outpatient   Postop Pain Control: Uneventful            Sign Out: Well controlled pain   PONV: No   Neuro/Psych: Uneventful            Sign Out: Acceptable/Baseline neuro status   Airway/Respiratory: Uneventful            Sign Out: Acceptable/Baseline resp. status   CV/Hemodynamics: Uneventful            Sign Out: Acceptable CV status; No obvious hypovolemia; No obvious fluid overload   Other NRE: NONE   DID A NON-ROUTINE EVENT OCCUR? No           Last vitals:  Vitals Value Taken Time   /92 08/14/23 1307   Temp     Pulse 100 08/14/23 1307   Resp     SpO2 96 % 08/14/23 1314   Vitals shown include unvalidated device data.    Electronically Signed By: UDAY Snow CRNA  August 14, 2023  1:16 PM

## 2023-08-14 NOTE — H&P
58 year old year old female here for colonoscopy for abdominal pain.  Ongoing since 2/2023. She had hysterectomy.  She notes looser stools following hysterectomy.  No known Crohn's, UC, or colon cancer in family.  This is her first colonoscopy.    Patient Active Problem List   Diagnosis    ADHD (attention deficit hyperactivity disorder), inattentive type    Intestinal bacterial overgrowth    Screening for malignant neoplasm of cervix    Shellfish allergy    Morbid obesity (H)       Past Medical History:   Diagnosis Date    ADHD     Breast cyst     Ear pain     sharp pains    HSV-2 infection     Tinnitus        Past Surgical History:   Procedure Laterality Date    DILATION AND CURETTAGE, HYSTEROSCOPY DIAGNOSTIC, COMBINED N/A 5/8/2023    Procedure: HYSTEROSCOPY, DIAGNOSTIC, WITH DILATION AND CURETTAGE OF UTERUS, endometrial sampling with myosure;  Surgeon: Eli Manzano MD;  Location: [facility]    HYSTERECTOMY, TOTAL, LAPAROSCOPIC, WITH SALPINGO-OOPHORECTOMY, CYSTOSCOPY Bilateral 6/14/2023    Procedure: HYSTERECTOMY, TOTAL, LAPAROSCOPIC WITH BILATERAL SALPINGO_OOPHERECTOMY and CYSTOSCOPY;  Surgeon: Eli Manzano MD;  Location: [facility]       Family History   Problem Relation Age of Onset    Breast Cancer Mother 60.00    No Known Problems Father        Current Outpatient Rx   Medication Sig Dispense Refill    bisacodyl (DULCOLAX) 5 MG EC tablet 2 days prior to procedure, take 2 tablets at 4 pm. 1 day prior to procedure, take 2 tablets at 4 pm. For additional instructions refer to your colonoscopy prep instructions. 4 tablet 0    polyethylene glycol (GOLYTELY) 236 g suspension 2 days prior at 5pm, mix and drink half of a jug of Golytely. Drink an 8 oz. glass of Golytely every 15 minutes until half of the jug is gone. Place remainder of Golytely in the refrigerator. 1 day prior at 5 pm, drink the 2nd half of a jug of Golytely bowel prep. 6 hours before your check-in time, drink an 8 oz. glass of  "Golytely every 15 minutes until half of the 2nd jug of Golytely is gone. Discard remainder of second jug. 8000 mL 0       Allergies   Allergen Reactions    Shellfish Containing Products [Shellfish-Derived Products] Hives     Happens when eating [place] in [place]  No respiratory symptoms to date.     Amoxicillin Hives       Pt reports that she has never smoked. She has never used smokeless tobacco. She reports that she does not use drugs.    Exam:  BP (!) 176/113   Pulse 92   Temp 97.9  F (36.6  C) (Oral)   Resp 16   Ht 1.772 m (5' 9.76\")   Wt 133.8 kg (295 lb)   SpO2 97%   BMI 42.61 kg/m      Awake, Alert OX3  Lungs - CTA bilaterally  CV - RRR, no murmurs, distal pulses intact  Abd - soft, non-distended, non-tender, +BS  Extr - No cyanosis or edema    A/P 58 year old year old female in need of colonoscopy for abdominal pain. Risks, benefits, alternatives, and complications were discussed including the possibility of perforation, bleeding, missed lesion and the patient agreed to proceed.    Ambrocio Curtis, DO on 8/14/2023 at 12:10 PM      "

## 2023-08-14 NOTE — LETTER
Alena Ferro  [id number] [facility] [address]      August 21, 2023    Dear Alena,  This letter is written to inform you of the results of your recent colonoscopy.  Your examination showed no abnormalities.     Final Diagnosis   A. Colon, random locations, biopsies:  - Colonic mucosa with no specific histopathologic abnormalities.  - No features of an acute, chronic or microscopic colitis identified.  - Negative for dysplasia or malignancy.       Given these findings,  I recommend that you undergo a repeat colonoscopy in ten years for screening. We will enter you into a recall system so you receive a reminder closer to the time that you are due for repeat examination.     Please remember that this recommendation is made with the understanding that you are not experiencing persistent changes in bowel function, bleeding per rectum, and/or significant abdominal pain. If you experience these symptoms, please contact your primary care provider for a further evaluation.     If you have any questions or concerns about the results of your colonoscopy or the appropriate follow-up, please contact my assistant at [phone]    Sincerely,      Ambrocio Curtis,    [facility]  ___

## 2023-08-17 ENCOUNTER — OFFICE VISIT (OUTPATIENT)
Dept: OBGYN | Facility: CLINIC | Age: 58
End: 2023-08-17
Payer: COMMERCIAL

## 2023-08-17 VITALS
SYSTOLIC BLOOD PRESSURE: 147 MMHG | DIASTOLIC BLOOD PRESSURE: 93 MMHG | WEIGHT: 293 LBS | TEMPERATURE: 98.8 F | BODY MASS INDEX: 42.76 KG/M2 | HEART RATE: 81 BPM

## 2023-08-17 DIAGNOSIS — R10.84 ABDOMINAL PAIN, GENERALIZED: Primary | ICD-10-CM

## 2023-08-17 PROCEDURE — 99024 POSTOP FOLLOW-UP VISIT: CPT | Performed by: OBSTETRICS & GYNECOLOGY

## 2023-08-17 NOTE — PROGRESS NOTES
[facility]  OB/GYN      CC: Post-op      SUBJECTIVE:     Alena is a 58 year old female who is 8w s/p TLH, BSO. Pelvic pain is still present, colonoscopy was normal. Has noted some oral lesions, wondering about Crohns. Has continued to have several episodes of bleeding.   Ambulation: no issues  Eating/Drinking: no issues  Urination: no issues     Past Medical History        Past Medical History:   Diagnosis Date    ADHD      Breast cyst      Ear pain       sharp pains    HSV-2 infection      Tinnitus              Past Surgical History         Past Surgical History:   Procedure Laterality Date    DILATION AND CURETTAGE, HYSTEROSCOPY DIAGNOSTIC, COMBINED N/A 5/8/2023     Procedure: HYSTEROSCOPY, DIAGNOSTIC, WITH DILATION AND CURETTAGE OF UTERUS, endometrial sampling with myosure;  Surgeon: Eli Manzano MD;  Location: [facility]    HYSTERECTOMY, TOTAL, LAPAROSCOPIC, WITH SALPINGO-OOPHORECTOMY, CYSTOSCOPY Bilateral 6/14/2023     Procedure: HYSTERECTOMY, TOTAL, LAPAROSCOPIC WITH BILATERAL SALPINGO_OOPHERECTOMY and CYSTOSCOPY;  Surgeon: Eli Manzano MD;  Location: [facility]            Current Outpatient Prescriptions          Current Outpatient Medications   Medication Sig Dispense Refill    insulin pen needle (32G X 4 MM) 32G X 4 MM miscellaneous Use 1 pen needles daily or as directed. 90 each 1    Semaglutide-Weight Management (WEGOVY) 0.25 MG/0.5ML pen Inject 0.25 mg Subcutaneous once a week for 28 days 2 mL 0    [START ON 7/28/2023] Semaglutide-Weight Management (WEGOVY) 0.5 MG/0.5ML pen Inject 0.5 mg Subcutaneous once a week for 28 days 2 mL 0    [START ON 8/29/2023] Semaglutide-Weight Management (WEGOVY) 1 MG/0.5ML pen Inject 1 mg Subcutaneous once a week 6 mL 3    valACYclovir (VALTREX) 1000 MG tablet Takes prn                OBJECTIVE:  BP (!) 147/93 (BP Location: Left arm, Patient Position: Chair, Cuff Size: Adult Large)   Pulse 81   Temp 98.8  F (37.1  C) (Tympanic)    Wt 134.3 kg (296 lb)   BMI 42.76 kg/m        PHYSICAL EXAM:               GENERAL: Pleasant, talkative and in no apparent distress              HEENT:  Normocephalic, non-tender.                LUNGS:  No increased work of breathing              HEART:  Regular rate, well perfused              ABDOMEN:  Bowel sounds are present throughout.  The abdomen is soft, appropriately tender.              INCISION: C/D/I              : Vaginal cuff is well-healed on inspection and on palpation without evidence of vaginal bleeding              NEURO:  No focal deficits              MSK:  Lower extremities without edema or tenderness.             ASSESSMENT/PLAN: 58 year old female who presents for postop visit, check on abdominal pain and vaginal bleeding.  Patient doing well overall.   No bleeding noted today and exam continues to be reassuring and appear well healed. Discussed that with continued bleeding, could consider imaging. SHe will message if still present in a couple weeks. Regarding pain, discussed reasonable to see GI to see about other sources as pelvic organs are all now gone and has already seen urology. Also discussed MSK pain as possibility given elevated BMI.    Pt states understanding, agreement with plan of care.    Eli Manzano MD  8/17/2023 3:15 PM

## 2023-08-18 ENCOUNTER — TELEPHONE (OUTPATIENT)
Dept: OBGYN | Facility: CLINIC | Age: 58
End: 2023-08-18
Payer: COMMERCIAL

## 2023-08-18 NOTE — TELEPHONE ENCOUNTER
Paperwork completed and given to Dr. Manzano to sign.    Camelia Luke   Clinic Station    [facility]  [phone]

## 2023-08-21 NOTE — TELEPHONE ENCOUNTER
Paperwork completed and signed by Dr. Manzano and faxed per pt request.    [place] [place]   Clinic Station    [facility]  [phone]

## 2023-08-25 NOTE — TELEPHONE ENCOUNTER
REFERRAL INFORMATION:  Referring Provider:  Eli Manzano MD   Referring Clinic:  [facility]   Reason for Visit/Diagnosis: Abdominal pain, generalized      FUTURE VISIT INFORMATION:  Appointment Date: 8/28/2023  Appointment Time:      NOTES STATUS DETAILS   OFFICE NOTE from Referring Provider Internal 8/17/2023 OV with OB MARIA C Manznao   OFFICE NOTE from Other Specialist Internal 6/30/2023 OV with LOLA Eid   HOSPITAL DISCHARGE SUMMARY/  ED VISITS N/A    OPERATIVE REPORT Internal 6/14/2023 Hysterectomy  5/8/2023 Hysteroscopy    MEDICATION LIST Internal         ENDOSCOPY  N/A    COLONOSCOPY Internal 8/14/2023   ERCP N/A    EUS N/A    STOOL TESTING N/A    PERTINENT LABS N/A    PATHOLOGY REPORTS (RELATED) N/A    IMAGING (CT, MRI, EGD, MRCP, Small Bowel Follow Through/SBT, MR/CT Enterography) Internal 3/22/2023 CT ABD PEL

## 2023-08-28 ENCOUNTER — OFFICE VISIT (OUTPATIENT)
Dept: GASTROENTEROLOGY | Facility: CLINIC | Age: 58
End: 2023-08-28
Payer: COMMERCIAL

## 2023-08-28 ENCOUNTER — PRE VISIT (OUTPATIENT)
Dept: GASTROENTEROLOGY | Facility: CLINIC | Age: 58
End: 2023-08-28
Payer: COMMERCIAL

## 2023-08-28 VITALS
DIASTOLIC BLOOD PRESSURE: 86 MMHG | SYSTOLIC BLOOD PRESSURE: 128 MMHG | OXYGEN SATURATION: 98 % | HEART RATE: 67 BPM | WEIGHT: 293 LBS | HEIGHT: 70 IN | BODY MASS INDEX: 41.95 KG/M2

## 2023-08-28 DIAGNOSIS — R10.31 ABDOMINAL PAIN, RIGHT LOWER QUADRANT: ICD-10-CM

## 2023-08-28 DIAGNOSIS — R10.84 ABDOMINAL PAIN, GENERALIZED: ICD-10-CM

## 2023-08-28 DIAGNOSIS — R10.32 ABDOMINAL PAIN, LEFT LOWER QUADRANT: Primary | ICD-10-CM

## 2023-08-28 LAB
ALBUMIN UR-MCNC: NEGATIVE MG/DL
APPEARANCE UR: CLEAR
BILIRUB UR QL STRIP: NEGATIVE
COLOR UR AUTO: YELLOW
GLUCOSE UR STRIP-MCNC: NEGATIVE MG/DL
HGB UR QL STRIP: NEGATIVE
KETONES UR STRIP-MCNC: NEGATIVE MG/DL
LEUKOCYTE ESTERASE UR QL STRIP: NEGATIVE
NITRATE UR QL: NEGATIVE
PH UR STRIP: 6 [PH] (ref 5–7)
SP GR UR STRIP: 1.01 (ref 1–1.03)
UROBILINOGEN UR STRIP-MCNC: NORMAL MG/DL

## 2023-08-28 PROCEDURE — 86364 TISS TRNSGLTMNASE EA IG CLAS: CPT | Performed by: NURSE PRACTITIONER

## 2023-08-28 PROCEDURE — 81003 URINALYSIS AUTO W/O SCOPE: CPT | Performed by: NURSE PRACTITIONER

## 2023-08-28 PROCEDURE — 86258 DGP ANTIBODY EACH IG CLASS: CPT | Performed by: NURSE PRACTITIONER

## 2023-08-28 PROCEDURE — 36415 COLL VENOUS BLD VENIPUNCTURE: CPT | Performed by: NURSE PRACTITIONER

## 2023-08-28 PROCEDURE — 99204 OFFICE O/P NEW MOD 45 MIN: CPT | Mod: 24 | Performed by: NURSE PRACTITIONER

## 2023-08-28 ASSESSMENT — PAIN SCALES - GENERAL: PAINLEVEL: NO PAIN (1)

## 2023-08-28 NOTE — LETTER
8/28/2023         RE: Alena Ferro  [address] [facility] [address]        Dear Colleague,    Thank you for referring your patient, Alena Ferro, to the [facility]. Please see a copy of my visit note below.    Gastroenterology CLINIC VISIT, NEW PATIENT    CC/REFERRING PROVIDER: No ref. provider found  REASON FOR CONSULTATION: abdominal pain    HPI: 58 year old female with PMH of ADHD,  pelvic pain, recent laparoscopic hysterectomy, prediabetes, and obesity, was referred  to GI clinic for consult on ongoing lower abdominal pain since February or March of 2023. Reported associated symptoms of fatigue, abdominal bloating, and nausea. Initially, was treated for UTI but had no improvement after antibiotic course. Referred to urology for microscopic hematuria. Normal cystoscopy. Found to have a cystic lesion in the uterus on ultrasound. Endometrial biopsy was suggestive for endometrial hyperplasia. There was cervical stenosis.  Underwent laparoscopic hysterectomy. Stated that her lower abdominal pain did not change after the surgery, but her bloating had improved.  Patient stated that about 2 weeks posthysterectomy, she developed frequent loose stools with some mucus that lasted for about 3 weeks. Negative enteric panel. Other ordered stool studies were not completed (did not collect a specimen because diarrhea had resolved). Has been having bowel movements every day, formed to soft stools. Sometimes, has a few stools a day.    Today, the patient verbalized concerns of rash on her extremities; she requests screening for Crohn's disease. 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 She had 2 instances of undiagnosed rash over the past 6 months- one time, there was a maculopapular diffuse rash in antecubital area ( pt showed picture) and another time, there was a slightly raised pink nodule on her right upper arm. She also mentioned seeing a bloody blister on her cheek some time  ago. A few weeks ago, there was some erythematous nodularity on the right lateral leg, resolved over night. She read in Internet that rash could represent Crohn's.  Patient reported history of SIBO many years ago, was treated with Cipro. Said that she was tested negative for lactose intolerance. Denies smoking. Social alcohol use. No illicit drug use. Not on chronic NSAIDs.  No family history of IBD.  Mother currently undergoing evaluation for PBC (primary bilary cholangitis).       PREVIOUS ENDOSCOPY:  Findings:       The perianal and digital rectal examinations were normal. Pertinent        negatives include normal sphincter tone and no palpable rectal lesions.        The entire examined colon appeared normal on direct and retroflexion        views.        Biopsies for histology were taken with a cold forceps from the right        colon, left colon and rectum for evaluation of microscopic colitis.        Non-bleeding internal hemorrhoids were found during retroflexion. The        hemorrhoids were Grade I (internal hemorrhoids that do not prolapse).       Final Diagnosis   A. Colon, random locations, biopsies:  - Colonic mucosa with no specific histopathologic abnormalities.  - No features of an acute, chronic or microscopic colitis identified.  - Negative for dysplasia or malignancy.       PERTINENT STUDIES Reviewed in EMR  3/22/2023 Abdominal CT  FINDINGS:   LOWER CHEST: Normal.   HEPATOBILIARY: Diffuse hepatic steatosis. No significant mass. No bile  duct dilatation. No calcified gallstones.   PANCREAS: Normal.   SPLEEN: Normal.   ADRENAL GLANDS: Normal.   KIDNEYS/BLADDER: No significant mass, stones, or hydronephrosis.   BOWEL: Diverticulosis in the colon. No acute inflammatory change. No  obstruction.    LYMPH NODES: Normal.   VASCULATURE: Unremarkable.   PELVIC ORGANS: Normal.   OTHER: None.   MUSCULOSKELETAL: Degenerative changes of the spine. No acute osseous  abnormality.                                                                    IMPRESSION:   1.  No acute intra-abdominal or intrapelvic process.  2.  No KUB calculi or hydronephrosis.  3.  Hepatic steatosis.  4.  Colonic diverticulosis without signs of diverticulitis.    ROS: 10pt ROS performed and otherwise negative.    PAST MEDICAL HISTORY:  Past Medical History:   Diagnosis Date     ADHD      Breast cyst      Ear pain     sharp pains     HSV-2 infection      Tinnitus        PREVIOUS ABDOMINAL/GYNECOLOGIC SURGERIES:    Past Surgical History:   Procedure Laterality Date     COLONOSCOPY N/A 8/14/2023    Procedure: Colonoscopy with biopsies;  Surgeon: Ambrocio Curtis DO;  Location: [facility]     DILATION AND CURETTAGE, HYSTEROSCOPY DIAGNOSTIC, COMBINED N/A 5/8/2023    Procedure: HYSTEROSCOPY, DIAGNOSTIC, WITH DILATION AND CURETTAGE OF UTERUS, endometrial sampling with myosure;  Surgeon: Eli Mnazano MD;  Location: [facility]     HYSTERECTOMY, TOTAL, LAPAROSCOPIC, WITH SALPINGO-OOPHORECTOMY, CYSTOSCOPY Bilateral 6/14/2023    Procedure: HYSTERECTOMY, TOTAL, LAPAROSCOPIC WITH BILATERAL SALPINGO_OOPHERECTOMY and CYSTOSCOPY;  Surgeon: Eli Manzano MD;  Location: [facility]         PERTINENT MEDICATIONS:  Current Outpatient Medications   Medication Sig Dispense Refill     [START ON 8/29/2023] Semaglutide-Weight Management (WEGOVY) 1 MG/0.5ML pen Inject 1 mg Subcutaneous once a week 6 mL 3     valACYclovir (VALTREX) 1000 MG tablet Takes prn           SOCIAL HISTORY:  Social History     Socioeconomic History     Marital status:      Spouse name: Not on file     Number of children: Not on file     Years of education: Not on file     Highest education level: Not on file   Occupational History     Not on file   Tobacco Use     Smoking status: Never     Smokeless tobacco: Never   Vaping Use     Vaping Use: Never used   Substance and Sexual Activity     Alcohol use: Not on file     Drug use: Never     Sexual activity: Not on file   Other  "Topics Concern     Not on file   Social History Narrative     Not on file     Social Determinants of Health     Financial Resource Strain: Not on file   Food Insecurity: Not on file   Transportation Needs: Not on file   Physical Activity: Not on file   Stress: Not on file   Social Connections: Not on file   Intimate Partner Violence: Not on file   Housing Stability: Not on file       FAMILY HISTORY:  Denies colon/panc/esophageal/other GI CA, no other Araya or other HPS-related Kay. No IBD/celiac, no other AI/liver/thyroid disease.    Family History   Problem Relation Age of Onset     Breast Cancer Mother 60.00     No Known Problems Father        PHYSICAL EXAMINATION:  Vitals reviewed  /86 (BP Location: Right arm, Patient Position: Sitting, Cuff Size: Adult Large)   Pulse 67   Ht 1.772 m (5' 9.75\")   Wt 132.9 kg (293 lb)   SpO2 98%   Breastfeeding No   BMI 42.34 kg/m      General: Patient appears well in no acute distress.    Skin: No visualized rash or lesions on visualized skin  HEENT:    EOMI, no erythema, sclera icterus or discharge noted.  Mouth mucosa intact, pink, moist  No cervical or supraclavicular lymphadenopathy. Thyroid gland not enlarged.  Resp: breathing comfortably without accessory muscle usage, speaking in full sentences, no cough. Lung sounds clear  Card: Regular and rhythmic S1 and S2. No gallop or rub. No murmur.  No LE edema.  Abdomen: Active bowel sounds X 4 quadrants. Soft to palpation. Some tenderness in suprapubic area.  No guarding or rebound tenderness. Reyes's sign negative.  MSK: Appears to have normal range of motion based on visualized movements  Neurologic: No apparent tremors, facial movements symmetric  Psych: affect normal, alert and oriented      ASSESSMENT/PLAN:    ICD-10-CM    1. Abdominal pain, left lower quadrant  R10.32 Tissue transglutaminase jadiel IgA and IgG     Deamidated Gliadin Peptide Antibody IgA & IgG     CT Enterography with Contrast     Adult GI Clinic " Follow-Up Order (Blank)     Tissue transglutaminase jadiel IgA and IgG     Deamidated Gliadin Peptide Antibody IgA & IgG      2. Abdominal pain, right lower quadrant  R10.31 Tissue transglutaminase jadiel IgA and IgG     Deamidated Gliadin Peptide Antibody IgA & IgG     CT Enterography with Contrast     UA reflex to Microscopic - lab collect     Adult GI Clinic Follow-Up Order (Blank)     Tissue transglutaminase jadiel IgA and IgG     Deamidated Gliadin Peptide Antibody IgA & IgG     UA reflex to Microscopic - lab collect         58 year old female  presented  to GI clinic for a consultation on lower abdominal pain since approximately 6 months ago. Cannot name any preceding events. Was treated for UTI with no improvement. Had negative urology evaluation. Referred to GYN to complete endometrial biopsy for a cystic lesion in uterus. Underwent laparoscopic hysterectomy for endometrial hyperplasia, but the pain is still present 2 months postoperatively. Patient denies acid reflux, dysphagia, odynophagia, poor appetite, and weight loss. Stool pattern reported as a few stools a day, soft to formed in consistency. No black or bloody stools. Normal recent lab work with no anemia or electrolytes abnormalities. Normal colonoscopy. CT scan of abdomen was suggestive for diverticulosis without diverticulitis and hepatic steatosis.   Patient requests screening for Crohn's disease due to a few episodes of rashes and diarrhea for 3 weeks in postoperative period. Rash do not resemble dermatitis herpetiformis per pictures she presented on her cell phone. I explained to the patient that her symptoms are not consistent with typical presentation of Crohn's disease, but she would like to proceed with CT enterography. Suggested screening for celiac disease- TTG and MAGNO were ordered. Likely, she suffers functional abdominal pain. Explained that her abdominal pain could be also musculoskeletal in etiology.   Monitor and observe stool pattern. Hold  probiotics for a few days and see if this help abdominal discomfort.  Will repeat UA as the patient mentioned blood on underwear and passing blood clots after hysterectomy.    Patient verbalized understanding and appreciation of care provided. Stated that all of the questions were answered to her/his satisfaction.  RTC in 3 months    Thank you for this consultation. It was a pleasure to participate in the care of this patient; please contact us with any further questions.    UDAY Ozuna, BLANCAP-C  [facility]  Gastroenterology Department  [place], [place]    This note was created with Dragon voice recognition software, and while reviewed for accuracy, inadvertent minor typographic errors may occur. Please contact the provider if you have any questions.       Again, thank you for allowing me to participate in the care of your patient.        Sincerely,        UDAY OZUNA CNP

## 2023-08-28 NOTE — PATIENT INSTRUCTIONS
"It was a pleasure taking care of you today.  I've included a brief summary of our discussion and care plan from today's visit below.  Please review this information with your primary care provider.  ______________________________________________________________________    My recommendations are summarized as follows:    As we discussed today, I'm going to order lab work for screening of celiac disease. I also ordered urine test to make sure there is no blood in the urine.    2. CT enterography ordered for assessment of small and large intestine.If comes back normal, will focus on management of IBS (irritable bowel syndrome).    3. You can try holding probiotics  for 1-2 weeks,and see if this would make any changes in bloating and lower abdominal discomfort.    Return to GI Clinic in 3 months to review your progress.    ______________________________________________________________________  Irritable bowel syndrome (IBS)   Irritable bowel syndrome (IBS) is a chronic condition of the digestive system. Its primary symptoms are abdominal pain and changes in bowel habits (eg, constipation and/or diarrhea).  There are a number of theories about how and why irritable bowel syndrome (IBS) develops. Despite intensive research, the cause is not clear.   -One theory suggests that IBS is caused by abnormal contractions of the colon and intestines (hence the term \"spastic bowel,\" which has sometimes been used to describe IBS).   -Some people develop IBS after a severe gastrointestinal infection (eg, Salmonella or Campylobacter, or viruses). However, it is not clear how the infection triggers IBS to develop, and most people with IBS do not have a history of these infections.  -People with IBS who seek medical help are more likely to suffer from anxiety and stress than those who do not seek help. Stress and anxiety are known to affect the intestine; thus, it is likely that anxiety and stress worsen symptoms.    -Food intolerances " "are common in patients with IBS, raising the possibility that it is caused by food sensitivity or allergy. This theory has been difficult to prove, although it continues to be studied.   A number of foods are known to cause symptoms that mimic or aggravate IBS, including dairy products (which contain lactose), legumes (such as beans), and cruciferous vegetables (such as broccoli, cauliflower, [place] sprouts, and cabbage). These foods increase intestinal gas, which can cause cramps.    -Many researchers believe that IBS is caused by heightened sensitivity of the intestines. The medical term for this is \"visceral hyperalgesia.\" This theory proposes that nerves in the bowels are overactive in people with IBS, so that normal amounts of gas or movement are perceived as excessive and painful.     A person with irritable bowel syndrome may have frequent loose stools. Bowel movements usually occur during the daytime, and most often in the morning or after meals. Diarrhea is often preceded by a sense of extreme urgency and followed by a feeling of incomplete emptying. About one-half of people with IBS also notice mucous discharge with diarrhea. Diarrhea occurring during the night is very unusual with IBS.     Treatments are often given to reduce the pain and other symptoms of IBS, and it may be necessary to try more than one combination of treatments to find the one that is most helpful for you.  Diet:  The first step in treating IBS is usually to monitor your symptoms, daily bowel habits, and any other factors that may affect your bowels. This can help to identify factors that worsen symptoms in some people with IBS, such as lactose or other food intolerances and stress. Keeping a daily diary to track your diet and bowel symptoms can be helpful.  Many clinicians recommend temporarily eliminating milk products, since lactose intolerance is common and can aggravate IBS or cause symptoms similar to IBS. The greatest " concentration of lactose is found in milk and ice cream, although it is present in smaller quantities in yogurt, cottage and other cheeses.Try eliminating dairy for 2 weeks. If IBS symptoms improve, it is reasonable to continue avoiding lactose.   Many foods are only partially digested in the small intestines. When they reach the colon (large intestine), further digestion takes place, which may cause gas and cramps. Eliminating these foods temporarily is reasonable if gas or bloating is bothersome.  The most common gas-producing foods are legumes (such as beans) and cruciferous vegetables (such as cabbage, [place] sprouts, cauliflower, and broccoli). In addition, some people have trouble with onions, celery, carrots, raisins, bananas, apricots, prunes, sprouts, and wheat.    A bulk-forming fiber supplement, such as Psyllium, may also be recommended to increase fiber intake since it is difficult to consume enough fiber in the diet. Fiber supplements should be started at a low dose and increased slowly over several weeks to reduce the symptoms of excessive intestinal gas, which can occur in some people when beginning fiber therapy.     Some foods are easy to digest for people with IBS related diarrhea. These include the following: plain pasta or noodles, white rice. Boiled or baked potato. Whole white bread, Pashto bread, plain fish, plain chicken or turkey, soft-boiled eggs, rice or soy milk, cooked carrots,and cereals (plain Cornflakes, Rice Krispies, Corn or Rice Chex, or Cheerios).    Other approaches to management of IBS:  Stress and anxiety can worsen IBS in some people. The best approach for reducing stress and anxiety depends upon your situation and the severity of your symptoms.  Although many drugs are available to treat the symptoms of IBS, these drugs do not cure the condition. They are mainly used to relieve symptoms.              ______________________________________________________________________    Who do I call with any questions after my visit?  Please be in touch if there are any further questions that arise following today's visit.  There are multiple ways to contact your gastroenterology care team.      During business hours, you may reach a Gastroenterology nurse at [phone], option 3.     To schedule or reschedule an appointment, please call [phone].   To schedule your imaging studies (CT, MRI, ultrasound)  call [phone] (or toll-free # [phone])  To schedule your lab work at [facility], please call [phone]    You can always send a secure message through Homejoy.  Homejoy messages are answered by your nurse or doctor typically within 24 hours.  Please allow extra time on weekends and holidays.      For urgent/emergent questions after business hours, you may reach the on-call GI Fellow by contacting the [facility]  at [phone].    In order for your refill to be processed in a timely fashion, it is your responsibility to ensure you follow the recommendations from your provider regarding your laboratory studies and follow up appointments.       How will I get the results of any tests ordered?    You will receive all of your results.  If you have signed up for [employer], any tests ordered at your visit will be available to you after your physician reviews them.  Typically this takes 1-2 weeks.  If there are urgent results that require a change in your care plan, your physician or nurse will call you to discuss the next steps.   What is Homejoy?  Homejoy is a secure way for you to access all of your healthcare records from the [facility].  It is a web based computer program, so you can sign on to it from any location.  It also allows you to send secure messages to your care team.  I recommend signing up for Homejoy access if you have not already done so  and are comfortable with using a computer.    How to I schedule a follow-up visit?  If you did not schedule a follow-up visit today, please call [phone] to schedule a follow-up office visit.      Sincerely,  YESICA Ozuna,  [facility],  Division of Gastroenterology   ([facility])

## 2023-08-28 NOTE — PROGRESS NOTES
Gastroenterology CLINIC VISIT, NEW PATIENT    CC/REFERRING PROVIDER: No ref. provider found  REASON FOR CONSULTATION: abdominal pain    HPI: 58 year old female with PMH of ADHD,  pelvic pain, recent laparoscopic hysterectomy, prediabetes, and obesity, was referred  to GI clinic for consult on ongoing lower abdominal pain since February or March of 2023. Reported associated symptoms of fatigue, abdominal bloating, and nausea. Initially, was treated for UTI but had no improvement after antibiotic course. Referred to urology for microscopic hematuria. Normal cystoscopy. Found to have a cystic lesion in the uterus on ultrasound. Endometrial biopsy was suggestive for endometrial hyperplasia. There was cervical stenosis.  Underwent laparoscopic hysterectomy. Stated that her lower abdominal pain did not change after the surgery, but her bloating had improved.  Patient stated that about 2 weeks posthysterectomy, she developed frequent loose stools with some mucus that lasted for about 3 weeks. Negative enteric panel. Other ordered stool studies were not completed (did not collect a specimen because diarrhea had resolved). Has been having bowel movements every day, formed to soft stools. Sometimes, has a few stools a day.    Today, the patient verbalized concerns of rash on her extremities; she requests screening for Crohn's disease. 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 She had 2 instances of undiagnosed rash over the past 6 months- one time, there was a maculopapular diffuse rash in antecubital area ( pt showed picture) and another time, there was a slightly raised pink nodule on her right upper arm. She also mentioned seeing a bloody blister on her cheek some time ago. A few weeks ago, there was some erythematous nodularity on the right lateral leg, resolved over night. She read in Internet that rash could represent Crohn's.  Patient reported history of SIBO many years ago, was treated with Cipro. Said that she was tested  negative for lactose intolerance. Denies smoking. Social alcohol use. No illicit drug use. Not on chronic NSAIDs.  No family history of IBD.  Mother currently undergoing evaluation for PBC (primary bilary cholangitis).       PREVIOUS ENDOSCOPY:  Findings:       The perianal and digital rectal examinations were normal. Pertinent        negatives include normal sphincter tone and no palpable rectal lesions.        The entire examined colon appeared normal on direct and retroflexion        views.        Biopsies for histology were taken with a cold forceps from the right        colon, left colon and rectum for evaluation of microscopic colitis.        Non-bleeding internal hemorrhoids were found during retroflexion. The        hemorrhoids were Grade I (internal hemorrhoids that do not prolapse).       Final Diagnosis   A. Colon, random locations, biopsies:  - Colonic mucosa with no specific histopathologic abnormalities.  - No features of an acute, chronic or microscopic colitis identified.  - Negative for dysplasia or malignancy.       PERTINENT STUDIES Reviewed in EMR  3/22/2023 Abdominal CT  FINDINGS:   LOWER CHEST: Normal.   HEPATOBILIARY: Diffuse hepatic steatosis. No significant mass. No bile  duct dilatation. No calcified gallstones.   PANCREAS: Normal.   SPLEEN: Normal.   ADRENAL GLANDS: Normal.   KIDNEYS/BLADDER: No significant mass, stones, or hydronephrosis.   BOWEL: Diverticulosis in the colon. No acute inflammatory change. No  obstruction.    LYMPH NODES: Normal.   VASCULATURE: Unremarkable.   PELVIC ORGANS: Normal.   OTHER: None.   MUSCULOSKELETAL: Degenerative changes of the spine. No acute osseous  abnormality.                                                                   IMPRESSION:   1.  No acute intra-abdominal or intrapelvic process.  2.  No KUB calculi or hydronephrosis.  3.  Hepatic steatosis.  4.  Colonic diverticulosis without signs of diverticulitis.    ROS: 10pt ROS performed and otherwise  negative.    PAST MEDICAL HISTORY:  Past Medical History:   Diagnosis Date    ADHD     Breast cyst     Ear pain     sharp pains    HSV-2 infection     Tinnitus        PREVIOUS ABDOMINAL/GYNECOLOGIC SURGERIES:    Past Surgical History:   Procedure Laterality Date    COLONOSCOPY N/A 8/14/2023    Procedure: Colonoscopy with biopsies;  Surgeon: Ambrocio Curtis DO;  Location: [facility]    DILATION AND CURETTAGE, HYSTEROSCOPY DIAGNOSTIC, COMBINED N/A 5/8/2023    Procedure: HYSTEROSCOPY, DIAGNOSTIC, WITH DILATION AND CURETTAGE OF UTERUS, endometrial sampling with myosure;  Surgeon: Eli Manzano MD;  Location: [facility]    HYSTERECTOMY, TOTAL, LAPAROSCOPIC, WITH SALPINGO-OOPHORECTOMY, CYSTOSCOPY Bilateral 6/14/2023    Procedure: HYSTERECTOMY, TOTAL, LAPAROSCOPIC WITH BILATERAL SALPINGO_OOPHERECTOMY and CYSTOSCOPY;  Surgeon: Eli Manzano MD;  Location: [facility]         PERTINENT MEDICATIONS:  Current Outpatient Medications   Medication Sig Dispense Refill    [START ON 8/29/2023] Semaglutide-Weight Management (WEGOVY) 1 MG/0.5ML pen Inject 1 mg Subcutaneous once a week 6 mL 3    valACYclovir (VALTREX) 1000 MG tablet Takes prn           SOCIAL HISTORY:  Social History     Socioeconomic History    Marital status:      Spouse name: Not on file    Number of children: Not on file    Years of education: Not on file    Highest education level: Not on file   Occupational History    Not on file   Tobacco Use    Smoking status: Never    Smokeless tobacco: Never   Vaping Use    Vaping Use: Never used   Substance and Sexual Activity    Alcohol use: Not on file    Drug use: Never    Sexual activity: Not on file   Other Topics Concern    Not on file   Social History Narrative    Not on file     Social Determinants of Health     Financial Resource Strain: Not on file   Food Insecurity: Not on file   Transportation Needs: Not on file   Physical Activity: Not on file   Stress: Not on file   Social  "Connections: Not on file   Intimate Partner Violence: Not on file   Housing Stability: Not on file       FAMILY HISTORY:  Denies colon/panc/esophageal/other GI CA, no other Araya or other HPS-related Kay. No IBD/celiac, no other AI/liver/thyroid disease.    Family History   Problem Relation Age of Onset    Breast Cancer Mother 60.00    No Known Problems Father        PHYSICAL EXAMINATION:  Vitals reviewed  /86 (BP Location: Right arm, Patient Position: Sitting, Cuff Size: Adult Large)   Pulse 67   Ht 1.772 m (5' 9.75\")   Wt 132.9 kg (293 lb)   SpO2 98%   Breastfeeding No   BMI 42.34 kg/m      General: Patient appears well in no acute distress.    Skin: No visualized rash or lesions on visualized skin  HEENT:    EOMI, no erythema, sclera icterus or discharge noted.  Mouth mucosa intact, pink, moist  No cervical or supraclavicular lymphadenopathy. Thyroid gland not enlarged.  Resp: breathing comfortably without accessory muscle usage, speaking in full sentences, no cough. Lung sounds clear  Card: Regular and rhythmic S1 and S2. No gallop or rub. No murmur.  No LE edema.  Abdomen: Active bowel sounds X 4 quadrants. Soft to palpation. Some tenderness in suprapubic area.  No guarding or rebound tenderness. Reyes's sign negative.  MSK: Appears to have normal range of motion based on visualized movements  Neurologic: No apparent tremors, facial movements symmetric  Psych: affect normal, alert and oriented      ASSESSMENT/PLAN:    ICD-10-CM    1. Abdominal pain, left lower quadrant  R10.32 Tissue transglutaminase jadiel IgA and IgG     Deamidated Gliadin Peptide Antibody IgA & IgG     CT Enterography with Contrast     Adult GI Clinic Follow-Up Order (Blank)     Tissue transglutaminase jadiel IgA and IgG     Deamidated Gliadin Peptide Antibody IgA & IgG      2. Abdominal pain, right lower quadrant  R10.31 Tissue transglutaminase jadiel IgA and IgG     Deamidated Gliadin Peptide Antibody IgA & IgG     CT Enterography with " Contrast     UA reflex to Microscopic - lab collect     Adult GI Clinic Follow-Up Order (Blank)     Tissue transglutaminase jadiel IgA and IgG     Deamidated Gliadin Peptide Antibody IgA & IgG     UA reflex to Microscopic - lab collect         58 year old female  presented  to GI clinic for a consultation on lower abdominal pain since approximately 6 months ago. Cannot name any preceding events. Was treated for UTI with no improvement. Had negative urology evaluation. Referred to GYN to complete endometrial biopsy for a cystic lesion in uterus. Underwent laparoscopic hysterectomy for endometrial hyperplasia, but the pain is still present 2 months postoperatively. Patient denies acid reflux, dysphagia, odynophagia, poor appetite, and weight loss. Stool pattern reported as a few stools a day, soft to formed in consistency. No black or bloody stools. Normal recent lab work with no anemia or electrolytes abnormalities. Normal colonoscopy. CT scan of abdomen was suggestive for diverticulosis without diverticulitis and hepatic steatosis.   Patient requests screening for Crohn's disease due to a few episodes of rashes and diarrhea for 3 weeks in postoperative period. Rash do not resemble dermatitis herpetiformis per pictures she presented on her cell phone. I explained to the patient that her symptoms are not consistent with typical presentation of Crohn's disease, but she would like to proceed with CT enterography. Suggested screening for celiac disease- TTG and MAGNO were ordered. Likely, she suffers functional abdominal pain. Explained that her abdominal pain could be also musculoskeletal in etiology.   Monitor and observe stool pattern. Hold probiotics for a few days and see if this help abdominal discomfort.  Will repeat UA as the patient mentioned blood on underwear and passing blood clots after hysterectomy.    Patient verbalized understanding and appreciation of care provided. Stated that all of the questions were  answered to her/his satisfaction.  RTC in 3 months    Thank you for this consultation. It was a pleasure to participate in the care of this patient; please contact us with any further questions.    UDAY Ozuna, CHIDI-PABLO  [facility]  Gastroenterology Department  [place], [place]    This note was created with Dragon voice recognition software, and while reviewed for accuracy, inadvertent minor typographic errors may occur. Please contact the provider if you have any questions.

## 2023-08-30 LAB
GLIADIN IGA SER-ACNC: 2.9 U/ML
GLIADIN IGG SER-ACNC: <0.6 U/ML
TTG IGA SER-ACNC: 1.1 U/ML
TTG IGG SER-ACNC: <0.6 U/ML

## 2023-09-01 ENCOUNTER — HOSPITAL ENCOUNTER (OUTPATIENT)
Dept: CT IMAGING | Facility: CLINIC | Age: 58
Discharge: HOME | End: 2023-09-01
Attending: NURSE PRACTITIONER | Admitting: NURSE PRACTITIONER
Payer: COMMERCIAL

## 2023-09-01 DIAGNOSIS — R10.32 ABDOMINAL PAIN, LEFT LOWER QUADRANT: ICD-10-CM

## 2023-09-01 DIAGNOSIS — R10.31 ABDOMINAL PAIN, RIGHT LOWER QUADRANT: ICD-10-CM

## 2023-09-01 PROCEDURE — 74177 CT ABD & PELVIS W/CONTRAST: CPT

## 2023-09-01 PROCEDURE — 250N000011 HC RX IP 250 OP 636: Performed by: NURSE PRACTITIONER

## 2023-09-01 PROCEDURE — 250N000009 HC RX 250: Performed by: NURSE PRACTITIONER

## 2023-09-01 RX ORDER — IOPAMIDOL 755 MG/ML
100 INJECTION, SOLUTION INTRAVASCULAR ONCE
Status: COMPLETED | OUTPATIENT
Start: 2023-09-01 | End: 2023-09-01

## 2023-09-01 RX ADMIN — SODIUM CHLORIDE 60 ML: 9 INJECTION, SOLUTION INTRAVENOUS at 09:09

## 2023-09-01 RX ADMIN — IOPAMIDOL 100 ML: 755 INJECTION, SOLUTION INTRAVENOUS at 09:09

## 2023-09-07 ENCOUNTER — OFFICE VISIT (OUTPATIENT)
Dept: FAMILY MEDICINE | Facility: CLINIC | Age: 58
End: 2023-09-07
Payer: COMMERCIAL

## 2023-09-07 VITALS
RESPIRATION RATE: 18 BRPM | HEART RATE: 73 BPM | BODY MASS INDEX: 41.95 KG/M2 | SYSTOLIC BLOOD PRESSURE: 128 MMHG | OXYGEN SATURATION: 97 % | WEIGHT: 293 LBS | HEIGHT: 70 IN | DIASTOLIC BLOOD PRESSURE: 84 MMHG | TEMPERATURE: 98.5 F

## 2023-09-07 DIAGNOSIS — E66.01 CLASS 3 SEVERE OBESITY DUE TO EXCESS CALORIES WITH SERIOUS COMORBIDITY AND BODY MASS INDEX (BMI) OF 40.0 TO 44.9 IN ADULT (H): ICD-10-CM

## 2023-09-07 DIAGNOSIS — E66.813 CLASS 3 SEVERE OBESITY DUE TO EXCESS CALORIES WITH SERIOUS COMORBIDITY AND BODY MASS INDEX (BMI) OF 40.0 TO 44.9 IN ADULT (H): ICD-10-CM

## 2023-09-07 DIAGNOSIS — Z23 NEED FOR PROPHYLACTIC VACCINATION AND INOCULATION AGAINST INFLUENZA: ICD-10-CM

## 2023-09-07 DIAGNOSIS — R10.2 SUPRAPUBIC PAIN: ICD-10-CM

## 2023-09-07 DIAGNOSIS — K76.0 HEPATIC STEATOSIS: ICD-10-CM

## 2023-09-07 DIAGNOSIS — R39.198 SLOW URINARY STREAM: Primary | ICD-10-CM

## 2023-09-07 PROCEDURE — 90682 RIV4 VACC RECOMBINANT DNA IM: CPT | Performed by: STUDENT IN AN ORGANIZED HEALTH CARE EDUCATION/TRAINING PROGRAM

## 2023-09-07 PROCEDURE — 99214 OFFICE O/P EST MOD 30 MIN: CPT | Mod: 25 | Performed by: STUDENT IN AN ORGANIZED HEALTH CARE EDUCATION/TRAINING PROGRAM

## 2023-09-07 PROCEDURE — 90471 IMMUNIZATION ADMIN: CPT | Performed by: STUDENT IN AN ORGANIZED HEALTH CARE EDUCATION/TRAINING PROGRAM

## 2023-09-07 ASSESSMENT — PAIN SCALES - GENERAL: PAINLEVEL: NO PAIN (1)

## 2023-09-07 NOTE — PATIENT INSTRUCTIONS
Bowel regimen:   - Miralax 2 capfuls daily  - Senna 2 tablets daily     Take this until you are having 2-3 bowel movements daily. When that is the case, drop 1 dosage (for example go to just 1 tablet of senna daily - best when taken at bedtime, and continue 2 capfuls miralax daily). Repeat, dropping a dose if you're having significant diarrhea and/or 2-3 bowel movements daily.     Recommend dropping doses in this order:   1 tablet of senna  1 capful of miralax  1 tablet of senna (should only be on 1 capful miralax then).   Final capful of miralax    Then go to as needed dosing, taking a dose if you haven't had a bowel movement in the past 24 hours. Some people need to continue daily senna or miralax, but many individuals can go to just as needed dosing.       Miralax, metamucil/Benefiber, or senna.       Senna 1 tablet at bedtime either daily, or just as needed when you haven't had a BM that day.

## 2023-09-07 NOTE — PROGRESS NOTES
Assessment & Plan     Patient is a pleasant 58-year-old female who presents today for follow-up on abdominal pain, as well as to follow-up on weight loss medications.    Slow urinary stream  Suprapubic pain  Patient has had issues with abdominal pain, for which she has been seeing me since earlier this year.  As part of her treatment plan, did get hysterectomy in June of this year with significant improvement in much of her pain.  However, she continues to have some pain in the right lower quadrant of the abdomen as well as suprapubic.  She was concerned given recent CT scan of the abdomen that was completed during GI evaluation regarding some mild bladder distention.  We reviewed that imaging together today.  Does have occasional slow urine stream.  Noted to have atrophy of the external genitalia in prior evaluations.  Discussed possible etiology for pain including constipation as there was a large ball of stool in the area of her pain on CT scan with some mild thinning of the abdominal wall musculature in that region, as well as pelvic floor weakness contributing to urinary symptoms in particular.  Has had urology evaluation in the past.  Today, we discussed trialing pelvic floor physical therapy as an initial next step.  She was interested in this, and referral was placed today.  We could also consider topical estrogen to see if that would help with the urinary symptoms.  For now, she has had significant work-up and I am not concerned for any insidious cause of her symptoms currently.  - Physical Therapy Referral  - PRIMARY CARE FOLLOW-UP SCHEDULING    Class 3 severe obesity due to excess calories with serious comorbidity and body mass index (BMI) of 40.0 to 44.9 in adult (H)  Hepatic steatosis  Patient was started on Wegovy, just recently increased to the 1 mg weekly dose.  Has had 3% weight loss since starting Wegovy, down 10 pounds.  Plan to continue at current dose for at least another 6 weeks, follow-up in  "clinic to see how she is doing with weight loss, which would be around that 3-month puja.  If she has made it to 5% weight loss, continue at that dose, but could consider increasing further to 2 mg weekly dose.    Need for prophylactic vaccination and inoculation against influenza  - INFLUENZA QUAD, RECOMBINANT, P-FREE (RIV4) (FLUBLOK)      I spent a total of 35 minutes on the day of the visit.   Time spent by me doing chart review, history and exam, documentation and further activities per the note     BMI:   Estimated body mass index is 42.34 kg/m  as calculated from the following:    Height as of this encounter: 1.772 m (5' 9.75\").    Weight as of this encounter: 132.9 kg (293 lb).       Lynn Eid MD  [facility]    Nilton Carvajal is a 58 year old, presenting for the following health issues:  Abdominal Pain and Imm/Inj (Flu Shot)        9/7/2023     2:13 PM   Additional Questions   Roomed by Yue MORGAN CMA       History of Present Illness       Reason for visit:  Abdominal pain (Wanted to catch up on what has been done. Says that she recenty saw gastroentrology and had another test that did not show any thing)  Symptom onset:  More than a month    She eats 2-3 servings of fruits and vegetables daily.She consumes 0 sweetened beverage(s) daily.She exercises with enough effort to increase her heart rate 9 or less minutes per day.  She exercises with enough effort to increase her heart rate 3 or less days per week.   She is taking medications regularly.     Wegovy going well.   Today is a good day, feels she is having more good days than bad.     Did some testing, didn't show any obvious bowel cause of symptoms.   Did show fatty liver.   Partialy distended baldder.   Roughly 6x6x8 cm  Cut caffeien and that has helped quite a bit.     Suprapubic, sometimes on the right side as well.     Starting weight: 303 (end of June)  Current weight: 293  Change: 3.3%      Review of Systems " "  Constitutional, HEENT, cardiovascular, pulmonary, GI, , musculoskeletal, neuro, skin, endocrine and psych systems are negative, except as otherwise noted.      Objective    /84 (BP Location: Right arm, Patient Position: Sitting, Cuff Size: Adult Large)   Pulse 73   Temp 98.5  F (36.9  C) (Tympanic)   Resp 18   Ht 1.772 m (5' 9.75\")   Wt 132.9 kg (293 lb)   SpO2 97%   BMI 42.34 kg/m    Body mass index is 42.34 kg/m .  Physical Exam  Constitutional:       Appearance: Normal appearance.   HENT:      Head: Normocephalic.   Eyes:      General: No scleral icterus.     Extraocular Movements: Extraocular movements intact.      Conjunctiva/sclera: Conjunctivae normal.   Cardiovascular:      Rate and Rhythm: Normal rate.   Pulmonary:      Effort: Pulmonary effort is normal.   Musculoskeletal:         General: Normal range of motion.      Cervical back: Normal range of motion.   Neurological:      General: No focal deficit present.      Mental Status: She is alert and oriented to person, place, and time.                          "

## 2023-10-23 ENCOUNTER — ANCILLARY PROCEDURE (OUTPATIENT)
Dept: GENERAL RADIOLOGY | Facility: CLINIC | Age: 58
End: 2023-10-23
Attending: STUDENT IN AN ORGANIZED HEALTH CARE EDUCATION/TRAINING PROGRAM
Payer: COMMERCIAL

## 2023-10-23 ENCOUNTER — OFFICE VISIT (OUTPATIENT)
Dept: FAMILY MEDICINE | Facility: CLINIC | Age: 58
End: 2023-10-23
Attending: STUDENT IN AN ORGANIZED HEALTH CARE EDUCATION/TRAINING PROGRAM
Payer: COMMERCIAL

## 2023-10-23 VITALS
BODY MASS INDEX: 40.94 KG/M2 | HEART RATE: 78 BPM | RESPIRATION RATE: 16 BRPM | DIASTOLIC BLOOD PRESSURE: 78 MMHG | OXYGEN SATURATION: 97 % | SYSTOLIC BLOOD PRESSURE: 134 MMHG | HEIGHT: 70 IN | WEIGHT: 286 LBS

## 2023-10-23 DIAGNOSIS — M25.512 CHRONIC LEFT SHOULDER PAIN: ICD-10-CM

## 2023-10-23 DIAGNOSIS — R73.03 PREDIABETES: ICD-10-CM

## 2023-10-23 DIAGNOSIS — R10.2 SUPRAPUBIC PAIN: ICD-10-CM

## 2023-10-23 DIAGNOSIS — E66.813 CLASS 3 SEVERE OBESITY DUE TO EXCESS CALORIES WITH SERIOUS COMORBIDITY AND BODY MASS INDEX (BMI) OF 40.0 TO 44.9 IN ADULT (H): Primary | ICD-10-CM

## 2023-10-23 DIAGNOSIS — M17.0 PRIMARY OSTEOARTHRITIS OF BOTH KNEES: ICD-10-CM

## 2023-10-23 DIAGNOSIS — G89.29 CHRONIC LEFT SHOULDER PAIN: ICD-10-CM

## 2023-10-23 DIAGNOSIS — E66.01 CLASS 3 SEVERE OBESITY DUE TO EXCESS CALORIES WITH SERIOUS COMORBIDITY AND BODY MASS INDEX (BMI) OF 40.0 TO 44.9 IN ADULT (H): Primary | ICD-10-CM

## 2023-10-23 DIAGNOSIS — K76.0 HEPATIC STEATOSIS: ICD-10-CM

## 2023-10-23 DIAGNOSIS — R39.198 SLOW URINARY STREAM: ICD-10-CM

## 2023-10-23 DIAGNOSIS — K21.9 GASTROESOPHAGEAL REFLUX DISEASE WITHOUT ESOPHAGITIS: ICD-10-CM

## 2023-10-23 PROCEDURE — 99214 OFFICE O/P EST MOD 30 MIN: CPT | Performed by: STUDENT IN AN ORGANIZED HEALTH CARE EDUCATION/TRAINING PROGRAM

## 2023-10-23 PROCEDURE — 73562 X-RAY EXAM OF KNEE 3: CPT | Mod: TC | Performed by: RADIOLOGY

## 2023-10-23 PROCEDURE — 90480 ADMN SARSCOV2 VAC 1/ONLY CMP: CPT | Performed by: STUDENT IN AN ORGANIZED HEALTH CARE EDUCATION/TRAINING PROGRAM

## 2023-10-23 PROCEDURE — 91320 SARSCV2 VAC 30MCG TRS-SUC IM: CPT | Performed by: STUDENT IN AN ORGANIZED HEALTH CARE EDUCATION/TRAINING PROGRAM

## 2023-10-23 PROCEDURE — 73030 X-RAY EXAM OF SHOULDER: CPT | Mod: TC | Performed by: RADIOLOGY

## 2023-10-23 RX ORDER — OMEPRAZOLE 10 MG/1
20 CAPSULE, DELAYED RELEASE ORAL DAILY
COMMUNITY
End: 2023-10-23 | Stop reason: DRUGHIGH

## 2023-10-23 ASSESSMENT — PAIN SCALES - GENERAL: PAINLEVEL: NO PAIN (0)

## 2023-10-23 NOTE — PROGRESS NOTES
"  Assessment & Plan     Class 3 severe obesity due to excess calories with serious comorbidity and body mass index (BMI) of 40.0 to 44.9 in adult (H)  Hepatic steatosis  Prediabetes  On Wegovy, 1 mg. Having some upset stomach especially in the morning, but this is better with omeprazole. Hesitate to increase dose until this has resolved. We discussed likely etiology of the slowed gastric emptying as cause of these symptoms. For now will use omeprazole as below and continue to monitor. May need increased dose Wegovy in the future, but will monitor for now. Recheck in 3 months, sooner if issues.     Starting weight: 303 (end of June)  Current weight: 286  Change: 5.6%    Suprapubic pain  Slow urinary stream  Improved symptoms with bowel medications. Continue to monitor for now, order for pelvic floor PT is still active if she desires this. Has not done this yet, doesn't plan to if symptoms remain well controlled.   - PRIMARY CARE FOLLOW-UP SCHEDULING    Chronic left shoulder pain  More internal pain, adan with abduction around 165 degrees. No significant tenderness on exam. Will obtain xr shoulder today. Likely 2/2 arthritis. Not severely bothersome today, and as we are addressing knee pain as below, hold off on steroid injection at this time. Consider PT. Use voltaren gel for now vs occasional ibuprofen or naproxen. OTC.   - XR Shoulder Left 2 Views    Primary osteoarthritis of both knees  Patient has had worsening anterior knee pain and \"crunching\". No significant tenderness on palpation of joint lines today. No injury. Had XR in 2013 that showed mild bilateral tricompartmental osteoarthritis. She is returning next week for bilateral knee injections.   - XR Knee Bilateral 3 Views    Gastroesophageal reflux disease without esophagitis  Will trial omeprazole short term for upset stomach 2/2 wegovy.   - omeprazole (PRILOSEC) 20 MG DR capsule  Dispense: 90 capsule; Refill: 1      I spent a total of 30 minutes on the day " "of the visit.   Time spent by me doing chart review, history and exam, documentation and further activities per the note     BMI:   Estimated body mass index is 41.33 kg/m  as calculated from the following:    Height as of this encounter: 1.772 m (5' 9.75\").    Weight as of this encounter: 129.7 kg (286 lb).     Lynn Eid MD  [facility]    Nilton Carvajal is a 58 year old, presenting for the following health issues:  Recheck Medication (wegovy)        10/23/2023     8:08 AM   Additional Questions   Roomed by Eli ESTRADA   Accompanied by Self       History of Present Illness       Reason for visit:  Medication followup    She eats 2-3 servings of fruits and vegetables daily.She consumes 0 sweetened beverage(s) daily.She exercises with enough effort to increase her heart rate 9 or less minutes per day.  She exercises with enough effort to increase her heart rate 3 or less days per week.   She is taking medications regularly.          Medication Followup of Wegovy  Taking Medication as prescribed: yes  Side Effects:  vomiting  Medication Helping Symptoms:  yes    Went up to 1 mg and got some reflux, some vomiting. But getting better.   Started prilosec at night.     This morning vs a couple days ago, down 2 lbs.     Left knee arthritis.   Toilet low, thinks this year in that house is contributing.   Anterior knee pain quite a bit, hurts like crazy.   Sitting on a pillow, going to get a higher toilet to see if that helps.     XR 2013    Left shoulder  Sleep on her side.   For about a year has been aching.   Not hurting right now, or during the day  Just when lays down.   Pillow under arm all the time at night, and more tolerable.   Pain with abduction around 165 degrees.     Review of Systems   Constitutional, HEENT, cardiovascular, pulmonary, GI, , musculoskeletal, neuro, skin, endocrine and psych systems are negative, except as otherwise noted.      Objective    /78 (BP " "Location: Right arm, Patient Position: Sitting, Cuff Size: Adult Regular)   Pulse 78   Resp 16   Ht 1.772 m (5' 9.75\")   Wt 129.7 kg (286 lb)   SpO2 97%   BMI 41.33 kg/m    Body mass index is 41.33 kg/m .  Physical Exam  Constitutional:       Appearance: Normal appearance.   HENT:      Head: Normocephalic.   Eyes:      General: No scleral icterus.     Extraocular Movements: Extraocular movements intact.      Conjunctiva/sclera: Conjunctivae normal.   Cardiovascular:      Rate and Rhythm: Normal rate.   Pulmonary:      Effort: Pulmonary effort is normal.   Musculoskeletal:         General: No tenderness.      Comments: Bilateral knee mild effusion.    Neurological:      General: No focal deficit present.      Mental Status: She is alert and oriented to person, place, and time.           Results from this visit  Results for orders placed or performed in visit on 10/23/23   XR Knee Bilateral 3 Views     Status: None    Narrative    XR BILATERAL KNEE THREE VIEWS   10/23/2023 9:30 AM     HISTORY:  Primary osteoarthritis of both knees    Comparison: None.      Impression    IMPRESSION:    Right: Tiny tricompartmental osteophytes. Mild patellofemoral  compartment narrowing. No fracture, effusion or calcified  intra-articular body.    Left: Tiny tricompartmental osteophytes. Mild lateral compartment and  patellofemoral compartment narrowing. No fracture, effusion or  calcified intra-articular body.    NOEMI CLARK MD         SYSTEM ID:  [id number]   Results for orders placed or performed in visit on 10/23/23   XR Shoulder Left 2 Views     Status: None    Narrative    XR LEFT SHOULDER TWO VIEWS   10/23/2023 9:28 AM     HISTORY:  Chronic left shoulder pain      Comparison: None.      Impression    IMPRESSION: Mild osteoarthrosis of the AC joint. Calcified granuloma  projecting over the midportion of the left lung. Otherwise negative.    NOEMI CLARK MD         SYSTEM ID:  [id number]                     "

## 2023-10-31 ENCOUNTER — OFFICE VISIT (OUTPATIENT)
Dept: FAMILY MEDICINE | Facility: CLINIC | Age: 58
End: 2023-10-31
Payer: COMMERCIAL

## 2023-10-31 VITALS
SYSTOLIC BLOOD PRESSURE: 144 MMHG | RESPIRATION RATE: 12 BRPM | OXYGEN SATURATION: 96 % | DIASTOLIC BLOOD PRESSURE: 92 MMHG | TEMPERATURE: 98.7 F | HEART RATE: 91 BPM

## 2023-10-31 DIAGNOSIS — M17.12 PRIMARY LOCALIZED OSTEOARTHROSIS OF LEFT LOWER LEG: Primary | ICD-10-CM

## 2023-10-31 PROCEDURE — 20610 DRAIN/INJ JOINT/BURSA W/O US: CPT | Mod: LT | Performed by: STUDENT IN AN ORGANIZED HEALTH CARE EDUCATION/TRAINING PROGRAM

## 2023-10-31 RX ORDER — TRIAMCINOLONE ACETONIDE 40 MG/ML
40 INJECTION, SUSPENSION INTRA-ARTICULAR; INTRAMUSCULAR ONCE
Status: COMPLETED | OUTPATIENT
Start: 2023-10-31 | End: 2023-10-31

## 2023-10-31 RX ADMIN — TRIAMCINOLONE ACETONIDE 40 MG: 40 INJECTION, SUSPENSION INTRA-ARTICULAR; INTRAMUSCULAR at 08:08

## 2023-10-31 ASSESSMENT — PAIN SCALES - GENERAL: PAINLEVEL: NO PAIN (1)

## 2023-10-31 NOTE — NURSING NOTE
"Chief Complaint   Patient presents with    Knee Pain     BP (!) 144/92   Pulse 91   Temp 98.7  F (37.1  C) (Tympanic)   Resp 12   SpO2 96%  Estimated body mass index is 41.33 kg/m  as calculated from the following:    Height as of 10/23/23: 1.772 m (5' 9.75\").    Weight as of 10/23/23: 129.7 kg (286 lb).  Patient presents to the clinic using No DME      Health Maintenance that is potentially due pending provider review:    Health Maintenance Due   Topic Date Due    ANNUAL REVIEW OF HM ORDERS  Never done    ADVANCE CARE PLANNING  Never done    HEPATITIS B IMMUNIZATION (1 of 3 - 3-dose series) Never done        n/a          "

## 2023-10-31 NOTE — PROGRESS NOTES
Left Knee Injection Note    Alena Ferro is a patient of Lynn Alvares here for injection for pain of the left knee.    Consent: Affirmation of informed consent was signed and scanned into the medical record. Risks, benefits and alternatives were discussed. Patient's questions were elicited and answered.   Procedure safety checklist was completed:  Yes  Time Out (Pause for the Cause) completed: Yes    Preoperative Diagnosis: Primary localized osteoarthrosis of left lower leg   Postoperative Diagnosis: same       The left knee was prepped  in the usual fashion INJECTION:  Using 4 mL of 1% lidocaine mixed with 40 mg of kenalog, the left kneee  was successfully injected without complication.     Technique:   Skin prep Alcohol pad  Anesthesia Ethyl Chloride  Complications:  No  Tolerance:  Pt tolerated procedure well and was in stable condition.     Follow up: Patient was instructed to use ice on the affected area tonight for at least 30 minutes and  that there will be a return to pain in a couple hours followed by relief in the next several days to a week. Patient was advised to call if increasing redness and swelling.     Follow up PRN    Diagnoses and all orders for this visit:  Primary localized osteoarthrosis of left lower leg  -     Large Joint/Bursa injection and/or drainage (Shoulder, Knee)  -     triamcinolone (KENALOG-40) injection 40 mg  -     lidocaine 1 % 4 mL    Lynn Eid MD           10/31/2023     7:16 AM   Additional Questions   Roomed by Charley BLAIR   Accompanied by Self     Knee pain follow up  Would like injections done in bilateral knees  XR BILATERAL KNEE THREE VIEWS   10/23/2023 9:30 AM      HISTORY:  Primary osteoarthritis of both knees     Comparison: None.                                                                      IMPRESSION:     Right: Tiny tricompartmental osteophytes. Mild patellofemoral  compartment narrowing. No fracture, effusion or calcified  intra-articular  body.     Left: Tiny tricompartmental osteophytes. Mild lateral compartment and  patellofemoral compartment narrowing. No fracture, effusion or  calcified intra-articular body.          Objective    BP (!) 144/92   Pulse 91   Temp 98.7  F (37.1  C) (Tympanic)   Resp 12   SpO2 96%   There is no height or weight on file to calculate BMI.

## 2023-11-09 ENCOUNTER — MYC REFILL (OUTPATIENT)
Dept: FAMILY MEDICINE | Facility: CLINIC | Age: 58
End: 2023-11-09
Payer: COMMERCIAL

## 2023-11-09 DIAGNOSIS — B00.9 HSV (HERPES SIMPLEX VIRUS) INFECTION: Primary | ICD-10-CM

## 2023-11-10 RX ORDER — VALACYCLOVIR HYDROCHLORIDE 1 G/1
1000 TABLET, FILM COATED ORAL DAILY
Qty: 30 TABLET | Refills: 11 | Status: SHIPPED | OUTPATIENT
Start: 2023-11-10

## 2023-12-11 ENCOUNTER — MYC MEDICAL ADVICE (OUTPATIENT)
Dept: FAMILY MEDICINE | Facility: CLINIC | Age: 58
End: 2023-12-11
Payer: COMMERCIAL

## 2023-12-11 DIAGNOSIS — E66.813 CLASS 3 SEVERE OBESITY WITHOUT SERIOUS COMORBIDITY WITH BODY MASS INDEX (BMI) OF 40.0 TO 44.9 IN ADULT, UNSPECIFIED OBESITY TYPE (H): ICD-10-CM

## 2023-12-11 DIAGNOSIS — E66.01 CLASS 3 SEVERE OBESITY WITHOUT SERIOUS COMORBIDITY WITH BODY MASS INDEX (BMI) OF 40.0 TO 44.9 IN ADULT, UNSPECIFIED OBESITY TYPE (H): ICD-10-CM

## 2023-12-11 DIAGNOSIS — R73.03 PREDIABETES: ICD-10-CM

## 2023-12-11 DIAGNOSIS — K21.9 GASTROESOPHAGEAL REFLUX DISEASE WITHOUT ESOPHAGITIS: ICD-10-CM

## 2023-12-11 DIAGNOSIS — K76.0 HEPATIC STEATOSIS: ICD-10-CM

## 2024-01-04 ENCOUNTER — MYC MEDICAL ADVICE (OUTPATIENT)
Dept: FAMILY MEDICINE | Facility: CLINIC | Age: 59
End: 2024-01-04
Payer: COMMERCIAL

## 2024-01-04 DIAGNOSIS — K76.0 HEPATIC STEATOSIS: ICD-10-CM

## 2024-01-04 DIAGNOSIS — E66.01 CLASS 3 SEVERE OBESITY WITHOUT SERIOUS COMORBIDITY WITH BODY MASS INDEX (BMI) OF 40.0 TO 44.9 IN ADULT, UNSPECIFIED OBESITY TYPE (H): Primary | ICD-10-CM

## 2024-01-04 DIAGNOSIS — E66.813 CLASS 3 SEVERE OBESITY WITHOUT SERIOUS COMORBIDITY WITH BODY MASS INDEX (BMI) OF 40.0 TO 44.9 IN ADULT, UNSPECIFIED OBESITY TYPE (H): Primary | ICD-10-CM

## 2024-01-04 DIAGNOSIS — R73.03 PREDIABETES: ICD-10-CM

## 2024-01-04 NOTE — TELEPHONE ENCOUNTER
Pls see [email or web address] message below.     Last Written Prescription Date:  12/12/23  Last Fill Quantity: 3 mls,  # refills: 3   Last office visit: 10/31/2023 ; last virtual visit: Visit date not found with prescribing provider:     Future Office Visit:      RX pended, message routed to provider for consideration.   Julie Behrendt RN

## 2024-02-25 ENCOUNTER — TELEPHONE (OUTPATIENT)
Dept: FAMILY MEDICINE | Facility: CLINIC | Age: 59
End: 2024-02-25
Payer: COMMERCIAL

## 2024-02-25 NOTE — TELEPHONE ENCOUNTER
Prior Authorization Retail Medication Request    Medication/Dose: Wegovy 2.4mg   Diagnosis and ICD code (if different than what is on RX):    New/renewal/insurance change PA/secondary ins. PA:  Previously Tried and Failed:    Rationale:  PRIOR AUTH LEAH-MD CALL [phone].  DRUG REQUIRES PRIOR AUTHORIZATION    Insurance   Primary: [employer]   Insurance ID:  [id number]    Secondary (if applicable):  Insurance ID:      Pharmacy Information (if different than what is on RX)  Name:    Phone:    Fax:

## 2024-03-04 ENCOUNTER — ALLIED HEALTH/NURSE VISIT (OUTPATIENT)
Dept: FAMILY MEDICINE | Facility: CLINIC | Age: 59
End: 2024-03-04
Payer: COMMERCIAL

## 2024-03-04 VITALS — BODY MASS INDEX: 40.38 KG/M2 | WEIGHT: 279.4 LBS

## 2024-03-04 DIAGNOSIS — E66.813 CLASS 3 SEVERE OBESITY WITHOUT SERIOUS COMORBIDITY WITH BODY MASS INDEX (BMI) OF 40.0 TO 44.9 IN ADULT, UNSPECIFIED OBESITY TYPE (H): Primary | ICD-10-CM

## 2024-03-04 DIAGNOSIS — E66.01 CLASS 3 SEVERE OBESITY WITHOUT SERIOUS COMORBIDITY WITH BODY MASS INDEX (BMI) OF 40.0 TO 44.9 IN ADULT, UNSPECIFIED OBESITY TYPE (H): Primary | ICD-10-CM

## 2024-03-04 PROCEDURE — 99207 PR NO CHARGE NURSE ONLY: CPT

## 2024-03-04 NOTE — TELEPHONE ENCOUNTER
Retail Pharmacy Prior Authorization Team   Phone: [phone]      In order to send the PA, the insurance will need an updated weight and BMI, last recorded weight, BMI was in Oct 2023.    Once you get the information, please send to me directly and I will send the PA.    Thank you.

## 2024-03-04 NOTE — TELEPHONE ENCOUNTER
Appt scheduled to day for Wegovy PA - requesting current weight.  [facility]

## 2024-03-05 NOTE — TELEPHONE ENCOUNTER
3/4/24 weight in Clinic    Wt 126.7 kg (279 lb 6.4 oz)     BMI 40.38 kg/m      BSA 2.5 m   [facility]

## 2024-03-07 NOTE — TELEPHONE ENCOUNTER
Retail Pharmacy Prior Authorization Team   Phone: [phone]      Prior Authorization Approval    Medication: WEGOVY 2.4 MG/0.75ML SC SOAJ  Authorization Effective Date: 3/6/2024  Authorization Expiration Date: 10/4/2024  Approved Dose/Quantity:   Reference #:     Insurance Company: [employer] - Phone [phone] Fax [phone]  Expected CoPay: $    CoPay Card Available: No    Financial Assistance Needed:   Which Pharmacy is filling the prescription: [place], [place] - [address] [place] [address]  Pharmacy Notified: Yes  Patient Notified: Patient already picked up

## 2024-05-28 ENCOUNTER — OFFICE VISIT (OUTPATIENT)
Dept: FAMILY MEDICINE | Facility: CLINIC | Age: 59
End: 2024-05-28
Payer: COMMERCIAL

## 2024-05-28 VITALS
TEMPERATURE: 98.5 F | HEART RATE: 89 BPM | RESPIRATION RATE: 14 BRPM | WEIGHT: 277.5 LBS | BODY MASS INDEX: 40.1 KG/M2 | DIASTOLIC BLOOD PRESSURE: 86 MMHG | SYSTOLIC BLOOD PRESSURE: 120 MMHG | OXYGEN SATURATION: 96 %

## 2024-05-28 DIAGNOSIS — M17.12 PRIMARY LOCALIZED OSTEOARTHROSIS OF LEFT LOWER LEG: Primary | ICD-10-CM

## 2024-05-28 PROCEDURE — 20610 DRAIN/INJ JOINT/BURSA W/O US: CPT | Mod: LT | Performed by: STUDENT IN AN ORGANIZED HEALTH CARE EDUCATION/TRAINING PROGRAM

## 2024-05-28 RX ORDER — TRIAMCINOLONE ACETONIDE 40 MG/ML
40 INJECTION, SUSPENSION INTRA-ARTICULAR; INTRAMUSCULAR ONCE
Status: COMPLETED | OUTPATIENT
Start: 2024-05-28 | End: 2024-05-28

## 2024-05-28 RX ORDER — LIDOCAINE HYDROCHLORIDE 10 MG/ML
4 INJECTION, SOLUTION INFILTRATION; PERINEURAL ONCE
Status: COMPLETED | OUTPATIENT
Start: 2024-05-28 | End: 2024-05-28

## 2024-05-28 RX ADMIN — LIDOCAINE HYDROCHLORIDE 4 ML: 10 INJECTION, SOLUTION INFILTRATION; PERINEURAL at 08:11

## 2024-05-28 RX ADMIN — TRIAMCINOLONE ACETONIDE 40 MG: 40 INJECTION, SUSPENSION INTRA-ARTICULAR; INTRAMUSCULAR at 08:11

## 2024-05-28 ASSESSMENT — PAIN SCALES - GENERAL: PAINLEVEL: NO PAIN (0)

## 2024-05-28 NOTE — NURSING NOTE
"Chief Complaint   Patient presents with    Knee Pain     Injection     /86   Pulse 89   Temp 98.5  F (36.9  C) (Tympanic)   Resp 14   Wt 125.9 kg (277 lb 8 oz)   SpO2 96%   BMI 40.10 kg/m   Estimated body mass index is 40.1 kg/m  as calculated from the following:    Height as of 10/23/23: 1.772 m (5' 9.75\").    Weight as of this encounter: 125.9 kg (277 lb 8 oz).  Patient presents to the clinic using No DME      Health Maintenance that is potentially due pending provider review:    Health Maintenance Due   Topic Date Due    ANNUAL REVIEW OF HM ORDERS  Never done    HEPATITIS B IMMUNIZATION (1 of 3 - 19+ 3-dose series) Never done                  "

## 2024-05-28 NOTE — PROGRESS NOTES
Nilton Carvajal is a 59 year old, presenting for the following health issues:  Knee Pain (Injection)        5/28/2024     7:12 AM   Additional Questions   Roomed by Charley BLAIR   Accompanied by Self         5/28/2024     7:12 AM   Patient Reported Additional Medications   Patient reports taking the following new medications .     History of Present Illness       Reason for visit:  Cortizone in knee    She eats 2-3 servings of fruits and vegetables daily.She consumes 0 sweetened beverage(s) daily.She exercises with enough effort to increase her heart rate 9 or less minutes per day.  She exercises with enough effort to increase her heart rate 3 or less days per week.   She is taking medications regularly.     Left knee injection  Had injection done 10/31/24   Has been doing well up until 6 weeks ago      Objective    /86   Pulse 89   Temp 98.5  F (36.9  C) (Tympanic)   Resp 14   Wt 125.9 kg (277 lb 8 oz)   SpO2 96%   BMI 40.10 kg/m    Body mass index is 40.1 kg/m .    Joint Injection Note    Alena Ferro is a patient of Lynn Alvares here for injection for arthritis and pain of the left knee.    Consent: Affirmation of informed consent was signed and scanned into the medical record. Risks, benefits and alternatives were discussed. Patient's questions were elicited and answered.   Procedure safety checklist was completed:  Yes  Time Out (Pause for the Cause) completed: Yes    Preoperative Diagnosis: Primary localized osteoarthrosis of left lower leg  Postoperative Diagnosis: same       The left knee was prepped  in the usual sterile fashion INJECTION:  Using 4 mL of 1% lidocaine mixed with 40 mg of kenalog, the left knee was successfully injected without complication.  Patient did experience some pain relief following injection.    Technique:   Skin prep Alcohol Pad  Anesthesia Ethyl Chloride  Complications:  No  Tolerance:  Pt tolerated procedure well and was in stable condition.     Follow  up: Patient was instructed to use ice on the affected area tonight for at least 30 minutes and  that there will be a return to pain in a couple hours followed by relief in the next several days to a week. Patient was advised to call if increasing redness and swelling.     Follow up PRN        Signed Electronically by: Lynn Eid MD

## 2024-05-31 ENCOUNTER — PATIENT OUTREACH (OUTPATIENT)
Dept: CARE COORDINATION | Facility: CLINIC | Age: 59
End: 2024-05-31
Payer: COMMERCIAL

## 2024-06-13 DIAGNOSIS — K21.9 GASTROESOPHAGEAL REFLUX DISEASE WITHOUT ESOPHAGITIS: ICD-10-CM

## 2024-06-14 ENCOUNTER — PATIENT OUTREACH (OUTPATIENT)
Dept: CARE COORDINATION | Facility: CLINIC | Age: 59
End: 2024-06-14
Payer: COMMERCIAL

## 2024-06-27 ENCOUNTER — HOSPITAL ENCOUNTER (OUTPATIENT)
Dept: MAMMOGRAPHY | Facility: CLINIC | Age: 59
Discharge: HOME | End: 2024-06-27
Attending: STUDENT IN AN ORGANIZED HEALTH CARE EDUCATION/TRAINING PROGRAM | Admitting: STUDENT IN AN ORGANIZED HEALTH CARE EDUCATION/TRAINING PROGRAM
Payer: COMMERCIAL

## 2024-06-27 DIAGNOSIS — Z12.31 VISIT FOR SCREENING MAMMOGRAM: ICD-10-CM

## 2024-06-27 PROCEDURE — 77063 BREAST TOMOSYNTHESIS BI: CPT

## 2024-09-10 DIAGNOSIS — K21.9 GASTROESOPHAGEAL REFLUX DISEASE WITHOUT ESOPHAGITIS: ICD-10-CM

## 2024-10-05 ENCOUNTER — HEALTH MAINTENANCE LETTER (OUTPATIENT)
Age: 59
End: 2024-10-05

## 2024-12-22 DIAGNOSIS — B00.9 HSV (HERPES SIMPLEX VIRUS) INFECTION: ICD-10-CM

## 2024-12-23 RX ORDER — VALACYCLOVIR HYDROCHLORIDE 1 G/1
1000 TABLET, FILM COATED ORAL DAILY
Qty: 30 TABLET | Refills: 5 | Status: SHIPPED | OUTPATIENT
Start: 2024-12-23

## 2025-04-11 ENCOUNTER — MYC MEDICAL ADVICE (OUTPATIENT)
Dept: FAMILY MEDICINE | Facility: CLINIC | Age: 60
End: 2025-04-11

## 2025-04-11 DIAGNOSIS — G62.9 NEUROPATHY: Primary | ICD-10-CM

## 2025-05-30 NOTE — NURSING NOTE
"Initial BP (!) 147/93 (BP Location: Left arm, Patient Position: Chair, Cuff Size: Adult Large)   Pulse 81   Temp 98.8  F (37.1  C) (Tympanic)   Wt 134.3 kg (296 lb)   BMI 42.76 kg/m   Estimated body mass index is 42.76 kg/m  as calculated from the following:    Height as of 8/14/23: 1.772 m (5' 9.76\").    Weight as of this encounter: 134.3 kg (296 lb). .        Yaritza Chu MA    "
17:43

## 2025-06-19 ENCOUNTER — MYC MEDICAL ADVICE (OUTPATIENT)
Dept: FAMILY MEDICINE | Facility: CLINIC | Age: 60
End: 2025-06-19
Payer: COMMERCIAL

## 2025-06-19 DIAGNOSIS — G62.9 NEUROPATHY: Primary | ICD-10-CM

## 2025-07-22 ENCOUNTER — HOSPITAL ENCOUNTER (OUTPATIENT)
Dept: MAMMOGRAPHY | Facility: CLINIC | Age: 60
Discharge: HOME | End: 2025-07-22
Attending: STUDENT IN AN ORGANIZED HEALTH CARE EDUCATION/TRAINING PROGRAM
Payer: COMMERCIAL

## 2025-07-22 DIAGNOSIS — Z12.31 VISIT FOR SCREENING MAMMOGRAM: ICD-10-CM

## 2025-07-22 PROCEDURE — 77067 SCR MAMMO BI INCL CAD: CPT

## 2025-08-18 ENCOUNTER — HOSPITAL ENCOUNTER (OUTPATIENT)
Dept: ULTRASOUND IMAGING | Facility: CLINIC | Age: 60
Discharge: HOME | End: 2025-08-18
Attending: STUDENT IN AN ORGANIZED HEALTH CARE EDUCATION/TRAINING PROGRAM
Payer: COMMERCIAL

## 2025-08-18 DIAGNOSIS — R92.8 ABNORMAL MAMMOGRAM: ICD-10-CM

## 2025-08-18 PROCEDURE — 76642 ULTRASOUND BREAST LIMITED: CPT | Mod: LT

## (undated) DEVICE — GLOVE BIOGEL PI MICRO INDICATOR UNDERGLOVE SZ 7.0 48970

## (undated) DEVICE — SU ENDO STITCH POLYSORB 0 48" 170052

## (undated) DEVICE — DRSG TELFA 3X8" 1238

## (undated) DEVICE — CATH INTERMITTENT CLEAN-CATH FEMALE 14FR 6" VINYL LF 420614

## (undated) DEVICE — NDL INSUFFLATION 13GA 120MM C2201

## (undated) DEVICE — TUBING INSUFFLATION W/FILTER CPC TO LUER 620-030-301

## (undated) DEVICE — ENDO TROCAR FIRST ENTRY KII FIOS ADV FIX 05X100MM CFF03

## (undated) DEVICE — SOL NACL 0.9% IRRIG 1000ML BOTTLE 07138-09

## (undated) DEVICE — ANTIFOG SOLUTION W/FOAM PAD 31142527

## (undated) DEVICE — PACK LAPAROSCOPY/PELVISCOPY STD

## (undated) DEVICE — NDL SPINAL 22GA 3.5" QUINCKE 405181

## (undated) DEVICE — GLOVE BIOGEL PI MICRO SZ 6.0 48560

## (undated) DEVICE — PREP CHLORAPREP 26ML TINTED ORANGE  260815

## (undated) DEVICE — STOCKING SLEEVE COMPRESSION CALF MED

## (undated) DEVICE — SU DERMABOND ADVANCED .7ML DNX12

## (undated) DEVICE — DECANTER VIAL 2006S

## (undated) DEVICE — Device

## (undated) DEVICE — SOL WATER IRRIG 1000ML BOTTLE 07139-09

## (undated) DEVICE — GOWN LG DISP 9515

## (undated) DEVICE — SUCTION TIP YANKAUER W/O VENT BULBOUS TIP

## (undated) DEVICE — TUBING SUCTION 12"X1/4" N612

## (undated) DEVICE — ESU CORD MONOPOLAR 10'  E0510

## (undated) DEVICE — CATH TRAY FOLEY SURESTEP 16FR W/URINE MTR STATLK LF A303416A

## (undated) DEVICE — SU VICRYL 0 CT-1 36" J946H

## (undated) DEVICE — ENDO FORCEP ENDOJAW BIOPSY 2.8MMX230CM FB-220U

## (undated) DEVICE — GLOVE BIOGEL SKINSENSE PC SZ 6.5 31465

## (undated) DEVICE — TUBING CYSTO/BLADDER IRRIG SET 80" 06544-01

## (undated) DEVICE — PREP CHLORHEXIDINE 4% 4OZ (HIBICLENS) 57504

## (undated) DEVICE — GLOVE BIOGEL PI MICRO INDICATOR UNDERGLOVE SZ 6.5 48965

## (undated) DEVICE — SEAL SET MYOSURE ROD LENS SCOPE SINGLE USE 40-902

## (undated) DEVICE — FILTER LAPROSHIELD SMOKE EVAC LSF1

## (undated) DEVICE — SYSTEM LAPAROVUE VISIBILITY LAPVUE10

## (undated) DEVICE — DILATOR CERVICAL OS FINDER TAPER 2-4MMX21.5CM 1176

## (undated) DEVICE — SUCTION MANIFOLD NEPTUNE 2 SYS 1 PORT 702-025-000

## (undated) DEVICE — DEVICE ENDO STITCH APPLIER 10MM 173016

## (undated) DEVICE — DRAPE POUCH INSTRUMENT 3 POCKET 1018L

## (undated) DEVICE — ESU ENDO SCISSORS 5MM CVD 5DCS

## (undated) DEVICE — GLOVE BIOGEL PI ULTRATOUCH G SZ 7.0 42170

## (undated) DEVICE — SOL NACL 0.9% IRRIG 3000ML BAG 07972-08

## (undated) DEVICE — SYR 10ML LL W/O NDL

## (undated) DEVICE — BLADE KNIFE SURG 11 371111

## (undated) DEVICE — PAD PERI INDIV WRAP 11" 2022

## (undated) DEVICE — SU MONOCRYL 4-0 PS-2 18" UND Y496G

## (undated) DEVICE — STOCKING SLEEVE COMPRESSION CALF LG

## (undated) DEVICE — ESU HOLSTER PLASTIC DISP E2400

## (undated) DEVICE — ENDO TROCAR FIRST ENTRY KII FIOS ADV FIX 11X100MM CFF33

## (undated) DEVICE — SYR 05ML LL W/O NDL

## (undated) DEVICE — SYR 50ML LL W/O NDL 309653

## (undated) DEVICE — DEVICE SUTURE GRASPER TROCAR CLOSURE 14GA PMITCSG

## (undated) DEVICE — PACK LAPAROTOMY CUSTOM LAKES

## (undated) DEVICE — SUCTION IRR STRYKERFLOW II W/TIP 250-070-520

## (undated) DEVICE — SU VICRYL 0 TIE 54" UND J287G

## (undated) DEVICE — SUCTION TIP YANKAUER STR K87

## (undated) DEVICE — SYRINGE EAR/ULCER STERILE 2 OZ BULB 4172

## (undated) DEVICE — PAD FLOOR SURGISAFE

## (undated) DEVICE — ESU LIGASURE LAPAROSCOPIC BLUNT TIP SEALER 5MMX37CM LF1837

## (undated) DEVICE — ENDO TROCAR SLEEVE KII ADV FIXATION 05X100MM CFS02

## (undated) DEVICE — KIT PROCEDURE FLUENT IN/OUT FLOWPAK TISS TRAP FLT-112S

## (undated) RX ORDER — FENTANYL CITRATE 50 UG/ML
INJECTION, SOLUTION INTRAMUSCULAR; INTRAVENOUS
Status: DISPENSED
Start: 2023-06-14

## (undated) RX ORDER — ACETAMINOPHEN 325 MG/1
TABLET ORAL
Status: DISPENSED
Start: 2023-05-08

## (undated) RX ORDER — PROPOFOL 10 MG/ML
INJECTION, EMULSION INTRAVENOUS
Status: DISPENSED
Start: 2023-06-14

## (undated) RX ORDER — LIDOCAINE HYDROCHLORIDE 10 MG/ML
INJECTION, SOLUTION EPIDURAL; INFILTRATION; INTRACAUDAL; PERINEURAL
Status: DISPENSED
Start: 2023-06-14

## (undated) RX ORDER — LIDOCAINE HYDROCHLORIDE 20 MG/ML
JELLY TOPICAL
Status: DISPENSED
Start: 2023-04-14

## (undated) RX ORDER — KETOROLAC TROMETHAMINE 15 MG/ML
INJECTION, SOLUTION INTRAMUSCULAR; INTRAVENOUS
Status: DISPENSED
Start: 2023-06-14

## (undated) RX ORDER — HYDRALAZINE HYDROCHLORIDE 20 MG/ML
INJECTION INTRAMUSCULAR; INTRAVENOUS
Status: DISPENSED
Start: 2023-06-14

## (undated) RX ORDER — LIDOCAINE HYDROCHLORIDE 10 MG/ML
INJECTION, SOLUTION EPIDURAL; INFILTRATION; INTRACAUDAL; PERINEURAL
Status: DISPENSED
Start: 2023-05-08

## (undated) RX ORDER — GABAPENTIN 300 MG/1
CAPSULE ORAL
Status: DISPENSED
Start: 2023-06-14

## (undated) RX ORDER — METOPROLOL TARTRATE 1 MG/ML
INJECTION, SOLUTION INTRAVENOUS
Status: DISPENSED
Start: 2023-06-14

## (undated) RX ORDER — PHENAZOPYRIDINE HYDROCHLORIDE 200 MG/1
TABLET, FILM COATED ORAL
Status: DISPENSED
Start: 2023-06-14

## (undated) RX ORDER — ACETAMINOPHEN 325 MG/1
TABLET ORAL
Status: DISPENSED
Start: 2023-06-14

## (undated) RX ORDER — DEXAMETHASONE SODIUM PHOSPHATE 4 MG/ML
INJECTION, SOLUTION INTRA-ARTICULAR; INTRALESIONAL; INTRAMUSCULAR; INTRAVENOUS; SOFT TISSUE
Status: DISPENSED
Start: 2023-05-08

## (undated) RX ORDER — DEXAMETHASONE SODIUM PHOSPHATE 4 MG/ML
INJECTION, SOLUTION INTRA-ARTICULAR; INTRALESIONAL; INTRAMUSCULAR; INTRAVENOUS; SOFT TISSUE
Status: DISPENSED
Start: 2023-06-14

## (undated) RX ORDER — ONDANSETRON 2 MG/ML
INJECTION INTRAMUSCULAR; INTRAVENOUS
Status: DISPENSED
Start: 2023-05-08

## (undated) RX ORDER — FENTANYL CITRATE 50 UG/ML
INJECTION, SOLUTION INTRAMUSCULAR; INTRAVENOUS
Status: DISPENSED
Start: 2023-05-08

## (undated) RX ORDER — BUPIVACAINE HYDROCHLORIDE 2.5 MG/ML
INJECTION, SOLUTION EPIDURAL; INFILTRATION; INTRACAUDAL
Status: DISPENSED
Start: 2023-05-08

## (undated) RX ORDER — GABAPENTIN 300 MG/1
CAPSULE ORAL
Status: DISPENSED
Start: 2023-05-08

## (undated) RX ORDER — CLINDAMYCIN PHOSPHATE 900 MG/50ML
INJECTION, SOLUTION INTRAVENOUS
Status: DISPENSED
Start: 2023-06-14

## (undated) RX ORDER — ONDANSETRON 2 MG/ML
INJECTION INTRAMUSCULAR; INTRAVENOUS
Status: DISPENSED
Start: 2023-06-14

## (undated) RX ORDER — BUPIVACAINE HYDROCHLORIDE 2.5 MG/ML
INJECTION, SOLUTION EPIDURAL; INFILTRATION; INTRACAUDAL
Status: DISPENSED
Start: 2023-06-14